# Patient Record
Sex: FEMALE | Race: WHITE | Employment: OTHER | URBAN - METROPOLITAN AREA
[De-identification: names, ages, dates, MRNs, and addresses within clinical notes are randomized per-mention and may not be internally consistent; named-entity substitution may affect disease eponyms.]

---

## 2017-03-15 ENCOUNTER — HOSPITAL ENCOUNTER (OUTPATIENT)
Dept: SURGERY | Age: 62
Discharge: HOME | End: 2017-03-15
Attending: UROLOGY | Admitting: UROLOGY

## 2017-03-15 VITALS
RESPIRATION RATE: 18 BRPM | DIASTOLIC BLOOD PRESSURE: 71 MMHG | BODY MASS INDEX: 28.83 KG/M2 | HEART RATE: 82 BPM | OXYGEN SATURATION: 99 % | TEMPERATURE: 97.4 F | SYSTOLIC BLOOD PRESSURE: 100 MMHG | HEIGHT: 59 IN | WEIGHT: 143 LBS

## 2017-03-15 DIAGNOSIS — R93.7: ICD-10-CM

## 2017-03-15 LAB
APTT: 41.3 SEC (ref 21–31.8)
INR BLD: 2.18 (ref 0.85–1.15)
PROTHROMBIN TIME: 24.6 SEC (ref 9.6–13)

## 2017-03-15 RX ORDER — MORPHINE SULFATE 4 MG/ML
1 INJECTION, SOLUTION INTRAMUSCULAR; INTRAVENOUS EVERY 5 MIN PRN
Status: DISCONTINUED | OUTPATIENT
Start: 2017-03-15 | End: 2017-03-16 | Stop reason: HOSPADM

## 2017-03-15 RX ORDER — PROMETHAZINE HYDROCHLORIDE 25 MG/ML
6.25 INJECTION, SOLUTION INTRAMUSCULAR; INTRAVENOUS
Status: ACTIVE | OUTPATIENT
Start: 2017-03-15 | End: 2017-03-15

## 2017-03-15 RX ORDER — PHENAZOPYRIDINE HYDROCHLORIDE 200 MG/1
200 TABLET, FILM COATED ORAL 3 TIMES DAILY PRN
Qty: 15 TABLET | Refills: 0 | Status: SHIPPED | OUTPATIENT
Start: 2017-03-15 | End: 2017-03-20

## 2017-03-15 RX ORDER — OXYCODONE HYDROCHLORIDE AND ACETAMINOPHEN 5; 325 MG/1; MG/1
1 TABLET ORAL PRN
Status: ACTIVE | OUTPATIENT
Start: 2017-03-15 | End: 2017-03-15

## 2017-03-15 RX ORDER — ONDANSETRON 2 MG/ML
4 INJECTION INTRAMUSCULAR; INTRAVENOUS
Status: ACTIVE | OUTPATIENT
Start: 2017-03-15 | End: 2017-03-15

## 2017-03-15 RX ORDER — HYDRALAZINE HYDROCHLORIDE 20 MG/ML
5 INJECTION INTRAMUSCULAR; INTRAVENOUS EVERY 10 MIN PRN
Status: DISCONTINUED | OUTPATIENT
Start: 2017-03-15 | End: 2017-03-16 | Stop reason: HOSPADM

## 2017-03-15 RX ORDER — HYDROMORPHONE HCL 110MG/55ML
0.25 PATIENT CONTROLLED ANALGESIA SYRINGE INTRAVENOUS EVERY 5 MIN PRN
Status: DISCONTINUED | OUTPATIENT
Start: 2017-03-15 | End: 2017-03-16 | Stop reason: HOSPADM

## 2017-03-15 RX ORDER — SODIUM CHLORIDE 0.9 % (FLUSH) 0.9 %
10 SYRINGE (ML) INJECTION EVERY 12 HOURS SCHEDULED
Status: DISCONTINUED | OUTPATIENT
Start: 2017-03-15 | End: 2017-03-16 | Stop reason: HOSPADM

## 2017-03-15 RX ORDER — SODIUM CHLORIDE, SODIUM LACTATE, POTASSIUM CHLORIDE, CALCIUM CHLORIDE 600; 310; 30; 20 MG/100ML; MG/100ML; MG/100ML; MG/100ML
INJECTION, SOLUTION INTRAVENOUS CONTINUOUS
Status: DISCONTINUED | OUTPATIENT
Start: 2017-03-15 | End: 2017-03-16 | Stop reason: HOSPADM

## 2017-03-15 RX ORDER — LABETALOL HYDROCHLORIDE 5 MG/ML
5 INJECTION, SOLUTION INTRAVENOUS EVERY 10 MIN PRN
Status: DISCONTINUED | OUTPATIENT
Start: 2017-03-15 | End: 2017-03-16 | Stop reason: HOSPADM

## 2017-03-15 RX ORDER — HYDROMORPHONE HCL 110MG/55ML
0.5 PATIENT CONTROLLED ANALGESIA SYRINGE INTRAVENOUS EVERY 5 MIN PRN
Status: DISCONTINUED | OUTPATIENT
Start: 2017-03-15 | End: 2017-03-16 | Stop reason: HOSPADM

## 2017-03-15 RX ORDER — MEPERIDINE HYDROCHLORIDE 25 MG/ML
12.5 INJECTION INTRAMUSCULAR; INTRAVENOUS; SUBCUTANEOUS EVERY 5 MIN PRN
Status: DISCONTINUED | OUTPATIENT
Start: 2017-03-15 | End: 2017-03-16 | Stop reason: HOSPADM

## 2017-03-15 RX ORDER — MORPHINE SULFATE 4 MG/ML
2 INJECTION, SOLUTION INTRAMUSCULAR; INTRAVENOUS EVERY 5 MIN PRN
Status: DISCONTINUED | OUTPATIENT
Start: 2017-03-15 | End: 2017-03-16 | Stop reason: HOSPADM

## 2017-03-15 RX ORDER — CEPHALEXIN 500 MG/1
500 CAPSULE ORAL 2 TIMES DAILY
Qty: 10 CAPSULE | Refills: 0 | Status: SHIPPED | OUTPATIENT
Start: 2017-03-15 | End: 2017-03-20

## 2017-03-15 RX ORDER — DIPHENHYDRAMINE HYDROCHLORIDE 50 MG/ML
12.5 INJECTION INTRAMUSCULAR; INTRAVENOUS
Status: ACTIVE | OUTPATIENT
Start: 2017-03-15 | End: 2017-03-15

## 2017-03-15 RX ORDER — OXYCODONE HYDROCHLORIDE AND ACETAMINOPHEN 5; 325 MG/1; MG/1
2 TABLET ORAL PRN
Status: ACTIVE | OUTPATIENT
Start: 2017-03-15 | End: 2017-03-15

## 2017-03-15 RX ORDER — SODIUM CHLORIDE 0.9 % (FLUSH) 0.9 %
10 SYRINGE (ML) INJECTION PRN
Status: DISCONTINUED | OUTPATIENT
Start: 2017-03-15 | End: 2017-03-16 | Stop reason: HOSPADM

## 2017-03-15 RX ORDER — LIDOCAINE HYDROCHLORIDE 10 MG/ML
1 INJECTION, SOLUTION EPIDURAL; INFILTRATION; INTRACAUDAL; PERINEURAL
Status: COMPLETED | OUTPATIENT
Start: 2017-03-15 | End: 2017-03-15

## 2017-03-15 RX ADMIN — LIDOCAINE HYDROCHLORIDE 1 ML: 10 INJECTION, SOLUTION EPIDURAL; INFILTRATION; INTRACAUDAL; PERINEURAL at 12:21

## 2017-03-15 RX ADMIN — SODIUM CHLORIDE, SODIUM LACTATE, POTASSIUM CHLORIDE, CALCIUM CHLORIDE: 600; 310; 30; 20 INJECTION, SOLUTION INTRAVENOUS at 12:20

## 2017-03-15 ASSESSMENT — PAIN SCALES - GENERAL
PAINLEVEL_OUTOF10: 0
PAINLEVEL_OUTOF10: 0

## 2017-03-15 ASSESSMENT — PAIN - FUNCTIONAL ASSESSMENT: PAIN_FUNCTIONAL_ASSESSMENT: 0-10

## 2017-09-13 ENCOUNTER — HOSPITAL ENCOUNTER (OUTPATIENT)
Dept: SURGERY | Age: 62
Discharge: HOME | End: 2017-09-13
Attending: UROLOGY | Admitting: UROLOGY

## 2017-09-13 VITALS
TEMPERATURE: 97.9 F | RESPIRATION RATE: 16 BRPM | HEART RATE: 79 BPM | OXYGEN SATURATION: 96 % | BODY MASS INDEX: 27.68 KG/M2 | WEIGHT: 141 LBS | SYSTOLIC BLOOD PRESSURE: 128 MMHG | HEIGHT: 60 IN | DIASTOLIC BLOOD PRESSURE: 51 MMHG

## 2017-09-13 LAB
APTT: 48.1 SEC (ref 24.1–34.9)
INR BLD: 2.14 (ref 0.85–1.15)
PROTHROMBIN TIME: 24.2 SEC (ref 9.6–13)

## 2017-09-13 RX ORDER — OXYCODONE HYDROCHLORIDE AND ACETAMINOPHEN 5; 325 MG/1; MG/1
1 TABLET ORAL PRN
Status: ACTIVE | OUTPATIENT
Start: 2017-09-13 | End: 2017-09-13

## 2017-09-13 RX ORDER — HYDROMORPHONE HCL 110MG/55ML
0.25 PATIENT CONTROLLED ANALGESIA SYRINGE INTRAVENOUS EVERY 5 MIN PRN
Status: DISCONTINUED | OUTPATIENT
Start: 2017-09-13 | End: 2017-09-14 | Stop reason: HOSPADM

## 2017-09-13 RX ORDER — MEPERIDINE HYDROCHLORIDE 25 MG/ML
12.5 INJECTION INTRAMUSCULAR; INTRAVENOUS; SUBCUTANEOUS EVERY 5 MIN PRN
Status: DISCONTINUED | OUTPATIENT
Start: 2017-09-13 | End: 2017-09-14 | Stop reason: HOSPADM

## 2017-09-13 RX ORDER — HYDROMORPHONE HCL 110MG/55ML
0.5 PATIENT CONTROLLED ANALGESIA SYRINGE INTRAVENOUS EVERY 5 MIN PRN
Status: DISCONTINUED | OUTPATIENT
Start: 2017-09-13 | End: 2017-09-14 | Stop reason: HOSPADM

## 2017-09-13 RX ORDER — ONDANSETRON 2 MG/ML
4 INJECTION INTRAMUSCULAR; INTRAVENOUS
Status: ACTIVE | OUTPATIENT
Start: 2017-09-13 | End: 2017-09-13

## 2017-09-13 RX ORDER — DIPHENHYDRAMINE HYDROCHLORIDE 50 MG/ML
12.5 INJECTION INTRAMUSCULAR; INTRAVENOUS
Status: ACTIVE | OUTPATIENT
Start: 2017-09-13 | End: 2017-09-13

## 2017-09-13 RX ORDER — LIDOCAINE HYDROCHLORIDE 10 MG/ML
1 INJECTION, SOLUTION EPIDURAL; INFILTRATION; INTRACAUDAL; PERINEURAL
Status: COMPLETED | OUTPATIENT
Start: 2017-09-13 | End: 2017-09-13

## 2017-09-13 RX ORDER — OXYCODONE HYDROCHLORIDE AND ACETAMINOPHEN 5; 325 MG/1; MG/1
2 TABLET ORAL PRN
Status: ACTIVE | OUTPATIENT
Start: 2017-09-13 | End: 2017-09-13

## 2017-09-13 RX ORDER — PHENAZOPYRIDINE HYDROCHLORIDE 200 MG/1
200 TABLET, FILM COATED ORAL 3 TIMES DAILY PRN
Qty: 15 TABLET | Refills: 0 | Status: SHIPPED | OUTPATIENT
Start: 2017-09-13 | End: 2017-09-18

## 2017-09-13 RX ORDER — SODIUM CHLORIDE 0.9 % (FLUSH) 0.9 %
10 SYRINGE (ML) INJECTION EVERY 12 HOURS SCHEDULED
Status: DISCONTINUED | OUTPATIENT
Start: 2017-09-13 | End: 2017-09-14 | Stop reason: HOSPADM

## 2017-09-13 RX ORDER — CEPHALEXIN 250 MG/1
250 CAPSULE ORAL 2 TIMES DAILY
Qty: 10 CAPSULE | Refills: 0 | Status: SHIPPED | OUTPATIENT
Start: 2017-09-13 | End: 2017-09-18

## 2017-09-13 RX ORDER — SODIUM CHLORIDE 0.9 % (FLUSH) 0.9 %
10 SYRINGE (ML) INJECTION PRN
Status: DISCONTINUED | OUTPATIENT
Start: 2017-09-13 | End: 2017-09-14 | Stop reason: HOSPADM

## 2017-09-13 RX ORDER — SODIUM CHLORIDE, SODIUM LACTATE, POTASSIUM CHLORIDE, CALCIUM CHLORIDE 600; 310; 30; 20 MG/100ML; MG/100ML; MG/100ML; MG/100ML
INJECTION, SOLUTION INTRAVENOUS CONTINUOUS
Status: DISCONTINUED | OUTPATIENT
Start: 2017-09-13 | End: 2017-09-14 | Stop reason: HOSPADM

## 2017-09-13 RX ORDER — LABETALOL HYDROCHLORIDE 5 MG/ML
5 INJECTION, SOLUTION INTRAVENOUS EVERY 10 MIN PRN
Status: DISCONTINUED | OUTPATIENT
Start: 2017-09-13 | End: 2017-09-14 | Stop reason: HOSPADM

## 2017-09-13 RX ORDER — HYDRALAZINE HYDROCHLORIDE 20 MG/ML
5 INJECTION INTRAMUSCULAR; INTRAVENOUS
Status: DISCONTINUED | OUTPATIENT
Start: 2017-09-13 | End: 2017-09-14 | Stop reason: HOSPADM

## 2017-09-13 RX ADMIN — SODIUM CHLORIDE, SODIUM LACTATE, POTASSIUM CHLORIDE, CALCIUM CHLORIDE: 600; 310; 30; 20 INJECTION, SOLUTION INTRAVENOUS at 11:59

## 2017-09-13 RX ADMIN — LIDOCAINE HYDROCHLORIDE 1 ML: 10 INJECTION, SOLUTION EPIDURAL; INFILTRATION; INTRACAUDAL; PERINEURAL at 12:00

## 2017-09-13 ASSESSMENT — PAIN - FUNCTIONAL ASSESSMENT: PAIN_FUNCTIONAL_ASSESSMENT: 0-10

## 2017-09-14 LAB
EKG ATRIAL RATE: 93 BPM
EKG DIAGNOSIS: NORMAL
EKG P AXIS: 36 DEGREES
EKG P-R INTERVAL: 142 MS
EKG Q-T INTERVAL: 376 MS
EKG QRS DURATION: 62 MS
EKG QTC CALCULATION (BAZETT): 467 MS
EKG R AXIS: 34 DEGREES
EKG T AXIS: 49 DEGREES
EKG VENTRICULAR RATE: 93 BPM

## 2017-09-14 PROCEDURE — 93010 ELECTROCARDIOGRAM REPORT: CPT | Performed by: INTERNAL MEDICINE

## 2018-03-02 ENCOUNTER — HOSPITAL ENCOUNTER (OUTPATIENT)
Dept: MAMMOGRAPHY | Age: 63
Discharge: HOME | End: 2018-03-02
Attending: PHYSICIAN ASSISTANT | Admitting: PHYSICIAN ASSISTANT

## 2018-03-02 DIAGNOSIS — Z12.31 ENCOUNTER FOR SCREENING MAMMOGRAM FOR BREAST CANCER: ICD-10-CM

## 2019-02-02 ENCOUNTER — HOSPITAL ENCOUNTER (EMERGENCY)
Age: 64
Discharge: HOME | End: 2019-02-03
Attending: EMERGENCY MEDICINE
Payer: MEDICARE

## 2019-02-02 ENCOUNTER — APPOINTMENT (OUTPATIENT)
Dept: CT IMAGING | Age: 64
End: 2019-02-02
Payer: MEDICARE

## 2019-02-02 VITALS
RESPIRATION RATE: 24 BRPM | DIASTOLIC BLOOD PRESSURE: 93 MMHG | OXYGEN SATURATION: 92 % | BODY MASS INDEX: 29.84 KG/M2 | SYSTOLIC BLOOD PRESSURE: 135 MMHG | WEIGHT: 152 LBS | HEIGHT: 60 IN | TEMPERATURE: 97.4 F | HEART RATE: 106 BPM

## 2019-02-02 DIAGNOSIS — J18.9 PNEUMONIA DUE TO ORGANISM: ICD-10-CM

## 2019-02-02 DIAGNOSIS — R10.12 LEFT UPPER QUADRANT PAIN: Primary | ICD-10-CM

## 2019-02-02 LAB
A/G RATIO: 1.2 (ref 1.1–2.2)
ALBUMIN SERPL-MCNC: 4 G/DL (ref 3.4–5)
ALP BLD-CCNC: 136 U/L (ref 40–129)
ALT SERPL-CCNC: 8 U/L (ref 10–40)
ANION GAP SERPL CALCULATED.3IONS-SCNC: 13 MMOL/L (ref 3–16)
AST SERPL-CCNC: 11 U/L (ref 15–37)
BACTERIA: ABNORMAL /HPF
BANDED NEUTROPHILS RELATIVE PERCENT: 6 % (ref 0–7)
BASOPHILS ABSOLUTE: 0.2 K/UL (ref 0–0.2)
BASOPHILS RELATIVE PERCENT: 1 %
BILIRUB SERPL-MCNC: <0.2 MG/DL (ref 0–1)
BILIRUBIN URINE: NEGATIVE
BLOOD, URINE: ABNORMAL
BUN BLDV-MCNC: 12 MG/DL (ref 7–20)
CALCIUM SERPL-MCNC: 9.7 MG/DL (ref 8.3–10.6)
CHLORIDE BLD-SCNC: 105 MMOL/L (ref 99–110)
CLARITY: CLEAR
CO2: 25 MMOL/L (ref 21–32)
COLOR: YELLOW
CREAT SERPL-MCNC: 0.7 MG/DL (ref 0.6–1.2)
EOSINOPHILS ABSOLUTE: 0.2 K/UL (ref 0–0.6)
EOSINOPHILS RELATIVE PERCENT: 1 %
EPITHELIAL CELLS, UA: ABNORMAL /HPF
GFR AFRICAN AMERICAN: >60
GFR NON-AFRICAN AMERICAN: >60
GLOBULIN: 3.3 G/DL
GLUCOSE BLD-MCNC: 124 MG/DL (ref 70–99)
GLUCOSE URINE: NEGATIVE MG/DL
HCT VFR BLD CALC: 43 % (ref 36–48)
HEMOGLOBIN: 14.4 G/DL (ref 12–16)
INR BLD: 1.98 (ref 0.86–1.14)
KETONES, URINE: NEGATIVE MG/DL
LACTIC ACID: 1.5 MMOL/L (ref 0.4–2)
LEUKOCYTE ESTERASE, URINE: NEGATIVE
LYMPHOCYTES ABSOLUTE: 4.4 K/UL (ref 1–5.1)
LYMPHOCYTES RELATIVE PERCENT: 26 %
MCH RBC QN AUTO: 30.5 PG (ref 26–34)
MCHC RBC AUTO-ENTMCNC: 33.5 G/DL (ref 31–36)
MCV RBC AUTO: 91 FL (ref 80–100)
MICROSCOPIC EXAMINATION: YES
MONOCYTES ABSOLUTE: 0.8 K/UL (ref 0–1.3)
MONOCYTES RELATIVE PERCENT: 5 %
NEUTROPHILS ABSOLUTE: 11.3 K/UL (ref 1.7–7.7)
NEUTROPHILS RELATIVE PERCENT: 61 %
NITRITE, URINE: NEGATIVE
PDW BLD-RTO: 14.3 % (ref 12.4–15.4)
PH UA: 6.5
PLATELET # BLD: 446 K/UL (ref 135–450)
PMV BLD AUTO: 8.5 FL (ref 5–10.5)
POTASSIUM REFLEX MAGNESIUM: 4.3 MMOL/L (ref 3.5–5.1)
PROTEIN UA: NEGATIVE MG/DL
PROTHROMBIN TIME: 22.1 SEC (ref 9.8–13)
RBC # BLD: 4.73 M/UL (ref 4–5.2)
RBC UA: ABNORMAL /HPF (ref 0–2)
SLIDE REVIEW: ABNORMAL
SODIUM BLD-SCNC: 143 MMOL/L (ref 136–145)
SPECIFIC GRAVITY UA: 1.02
TOTAL PROTEIN: 7.3 G/DL (ref 6.4–8.2)
URINE TYPE: ABNORMAL
UROBILINOGEN, URINE: 0.2 E.U./DL
WBC # BLD: 16.8 K/UL (ref 4–11)
WBC UA: ABNORMAL /HPF (ref 0–5)

## 2019-02-02 PROCEDURE — 99285 EMERGENCY DEPT VISIT HI MDM: CPT

## 2019-02-02 PROCEDURE — 80053 COMPREHEN METABOLIC PANEL: CPT

## 2019-02-02 PROCEDURE — 74177 CT ABD & PELVIS W/CONTRAST: CPT

## 2019-02-02 PROCEDURE — 81001 URINALYSIS AUTO W/SCOPE: CPT

## 2019-02-02 PROCEDURE — 6360000004 HC RX CONTRAST MEDICATION: Performed by: EMERGENCY MEDICINE

## 2019-02-02 PROCEDURE — 83605 ASSAY OF LACTIC ACID: CPT

## 2019-02-02 PROCEDURE — 96365 THER/PROPH/DIAG IV INF INIT: CPT

## 2019-02-02 PROCEDURE — 36415 COLL VENOUS BLD VENIPUNCTURE: CPT

## 2019-02-02 PROCEDURE — 96375 TX/PRO/DX INJ NEW DRUG ADDON: CPT

## 2019-02-02 PROCEDURE — 6360000002 HC RX W HCPCS: Performed by: EMERGENCY MEDICINE

## 2019-02-02 PROCEDURE — 6370000000 HC RX 637 (ALT 250 FOR IP): Performed by: EMERGENCY MEDICINE

## 2019-02-02 PROCEDURE — 2580000003 HC RX 258: Performed by: EMERGENCY MEDICINE

## 2019-02-02 PROCEDURE — 85025 COMPLETE CBC W/AUTO DIFF WBC: CPT

## 2019-02-02 PROCEDURE — 85610 PROTHROMBIN TIME: CPT

## 2019-02-02 RX ORDER — CIPROFLOXACIN 500 MG/1
500 TABLET, FILM COATED ORAL 2 TIMES DAILY
Qty: 14 TABLET | Refills: 0 | Status: SHIPPED | OUTPATIENT
Start: 2019-02-02 | End: 2019-02-09

## 2019-02-02 RX ORDER — CIPROFLOXACIN 500 MG/1
500 TABLET, FILM COATED ORAL ONCE
Status: COMPLETED | OUTPATIENT
Start: 2019-02-02 | End: 2019-02-02

## 2019-02-02 RX ORDER — MORPHINE SULFATE 10 MG/ML
INJECTION, SOLUTION INTRAMUSCULAR; INTRAVENOUS
Status: DISCONTINUED
Start: 2019-02-02 | End: 2019-02-03 | Stop reason: HOSPADM

## 2019-02-02 RX ORDER — CLINDAMYCIN HYDROCHLORIDE 150 MG/1
300 CAPSULE ORAL ONCE
Status: COMPLETED | OUTPATIENT
Start: 2019-02-02 | End: 2019-02-02

## 2019-02-02 RX ORDER — MORPHINE SULFATE 2 MG/ML
2 INJECTION, SOLUTION INTRAMUSCULAR; INTRAVENOUS ONCE
Status: COMPLETED | OUTPATIENT
Start: 2019-02-02 | End: 2019-02-02

## 2019-02-02 RX ORDER — CLINDAMYCIN HYDROCHLORIDE 300 MG/1
300 CAPSULE ORAL 2 TIMES DAILY
Qty: 14 CAPSULE | Refills: 0 | Status: SHIPPED | OUTPATIENT
Start: 2019-02-02 | End: 2019-02-09

## 2019-02-02 RX ADMIN — DEXTROSE MONOHYDRATE 500 MG: 50 INJECTION, SOLUTION INTRAVENOUS at 23:20

## 2019-02-02 RX ADMIN — IOPAMIDOL 75 ML: 755 INJECTION, SOLUTION INTRAVENOUS at 22:27

## 2019-02-02 RX ADMIN — CLINDAMYCIN HYDROCHLORIDE 300 MG: 150 CAPSULE ORAL at 23:20

## 2019-02-02 RX ADMIN — MORPHINE SULFATE 2 MG: 2 INJECTION, SOLUTION INTRAMUSCULAR; INTRAVENOUS at 22:32

## 2019-02-02 RX ADMIN — CIPROFLOXACIN HYDROCHLORIDE 500 MG: 500 TABLET, FILM COATED ORAL at 23:20

## 2019-02-02 ASSESSMENT — PAIN DESCRIPTION - ORIENTATION: ORIENTATION: LEFT

## 2019-02-02 ASSESSMENT — PAIN DESCRIPTION - LOCATION: LOCATION: ABDOMEN

## 2019-02-02 ASSESSMENT — PAIN DESCRIPTION - PAIN TYPE: TYPE: ACUTE PAIN

## 2019-02-02 ASSESSMENT — PAIN DESCRIPTION - DESCRIPTORS: DESCRIPTORS: CONSTANT;RADIATING;SHARP

## 2019-02-02 ASSESSMENT — PAIN SCALES - GENERAL: PAINLEVEL_OUTOF10: 9

## 2019-09-24 RX ORDER — OMEPRAZOLE 20 MG/1
20 CAPSULE, DELAYED RELEASE ORAL DAILY
COMMUNITY

## 2019-09-30 ENCOUNTER — ANESTHESIA EVENT (OUTPATIENT)
Dept: OPERATING ROOM | Age: 64
End: 2019-09-30
Payer: MEDICARE

## 2019-09-30 ENCOUNTER — HOSPITAL ENCOUNTER (OUTPATIENT)
Age: 64
Setting detail: OUTPATIENT SURGERY
Discharge: HOME | End: 2019-09-30
Attending: UROLOGY | Admitting: UROLOGY
Payer: MEDICARE

## 2019-09-30 ENCOUNTER — ANESTHESIA (OUTPATIENT)
Dept: OPERATING ROOM | Age: 64
End: 2019-09-30
Payer: MEDICARE

## 2019-09-30 VITALS
WEIGHT: 141 LBS | SYSTOLIC BLOOD PRESSURE: 106 MMHG | DIASTOLIC BLOOD PRESSURE: 58 MMHG | HEIGHT: 60 IN | OXYGEN SATURATION: 94 % | HEART RATE: 79 BPM | BODY MASS INDEX: 27.68 KG/M2 | TEMPERATURE: 97.7 F | RESPIRATION RATE: 20 BRPM

## 2019-09-30 VITALS
SYSTOLIC BLOOD PRESSURE: 103 MMHG | OXYGEN SATURATION: 94 % | DIASTOLIC BLOOD PRESSURE: 62 MMHG | RESPIRATION RATE: 18 BRPM

## 2019-09-30 LAB
APTT: 49 SEC (ref 26–36)
INR BLD: 1.76 (ref 0.86–1.14)
PROTHROMBIN TIME: 20.1 SEC (ref 9.8–13)

## 2019-09-30 PROCEDURE — 2709999900 HC NON-CHARGEABLE SUPPLY: Performed by: UROLOGY

## 2019-09-30 PROCEDURE — 7100000011 HC PHASE II RECOVERY - ADDTL 15 MIN: Performed by: UROLOGY

## 2019-09-30 PROCEDURE — 85730 THROMBOPLASTIN TIME PARTIAL: CPT

## 2019-09-30 PROCEDURE — 36415 COLL VENOUS BLD VENIPUNCTURE: CPT

## 2019-09-30 PROCEDURE — 3700000001 HC ADD 15 MINUTES (ANESTHESIA): Performed by: UROLOGY

## 2019-09-30 PROCEDURE — 7100000010 HC PHASE II RECOVERY - FIRST 15 MIN: Performed by: UROLOGY

## 2019-09-30 PROCEDURE — 6370000000 HC RX 637 (ALT 250 FOR IP): Performed by: UROLOGY

## 2019-09-30 PROCEDURE — 6360000002 HC RX W HCPCS: Performed by: NURSE ANESTHETIST, CERTIFIED REGISTERED

## 2019-09-30 PROCEDURE — 3700000000 HC ANESTHESIA ATTENDED CARE: Performed by: UROLOGY

## 2019-09-30 PROCEDURE — 2580000003 HC RX 258: Performed by: ANESTHESIOLOGY

## 2019-09-30 PROCEDURE — 3600000004 HC SURGERY LEVEL 4 BASE: Performed by: UROLOGY

## 2019-09-30 PROCEDURE — 6360000002 HC RX W HCPCS: Performed by: UROLOGY

## 2019-09-30 PROCEDURE — 2500000003 HC RX 250 WO HCPCS: Performed by: NURSE ANESTHETIST, CERTIFIED REGISTERED

## 2019-09-30 PROCEDURE — 85610 PROTHROMBIN TIME: CPT

## 2019-09-30 PROCEDURE — 3600000014 HC SURGERY LEVEL 4 ADDTL 15MIN: Performed by: UROLOGY

## 2019-09-30 RX ORDER — MORPHINE SULFATE 10 MG/ML
2 INJECTION, SOLUTION INTRAMUSCULAR; INTRAVENOUS EVERY 5 MIN PRN
Status: DISCONTINUED | OUTPATIENT
Start: 2019-09-30 | End: 2019-09-30 | Stop reason: HOSPADM

## 2019-09-30 RX ORDER — PHENAZOPYRIDINE HYDROCHLORIDE 200 MG/1
200 TABLET, FILM COATED ORAL 3 TIMES DAILY PRN
Qty: 15 TABLET | Refills: 0 | Status: SHIPPED | OUTPATIENT
Start: 2019-09-30 | End: 2019-10-05

## 2019-09-30 RX ORDER — SODIUM CHLORIDE, SODIUM LACTATE, POTASSIUM CHLORIDE, CALCIUM CHLORIDE 600; 310; 30; 20 MG/100ML; MG/100ML; MG/100ML; MG/100ML
INJECTION, SOLUTION INTRAVENOUS CONTINUOUS
Status: DISCONTINUED | OUTPATIENT
Start: 2019-09-30 | End: 2019-09-30 | Stop reason: HOSPADM

## 2019-09-30 RX ORDER — LABETALOL HYDROCHLORIDE 5 MG/ML
5 INJECTION, SOLUTION INTRAVENOUS EVERY 10 MIN PRN
Status: DISCONTINUED | OUTPATIENT
Start: 2019-09-30 | End: 2019-09-30 | Stop reason: HOSPADM

## 2019-09-30 RX ORDER — LIDOCAINE HYDROCHLORIDE 20 MG/ML
JELLY TOPICAL PRN
Status: DISCONTINUED | OUTPATIENT
Start: 2019-09-30 | End: 2019-09-30 | Stop reason: ALTCHOICE

## 2019-09-30 RX ORDER — FENTANYL CITRATE 50 UG/ML
INJECTION, SOLUTION INTRAMUSCULAR; INTRAVENOUS PRN
Status: DISCONTINUED | OUTPATIENT
Start: 2019-09-30 | End: 2019-09-30 | Stop reason: SDUPTHER

## 2019-09-30 RX ORDER — LIDOCAINE HYDROCHLORIDE 10 MG/ML
0.3 INJECTION, SOLUTION EPIDURAL; INFILTRATION; INTRACAUDAL; PERINEURAL
Status: DISCONTINUED | OUTPATIENT
Start: 2019-09-30 | End: 2019-09-30 | Stop reason: HOSPADM

## 2019-09-30 RX ORDER — OXYCODONE HYDROCHLORIDE AND ACETAMINOPHEN 5; 325 MG/1; MG/1
2 TABLET ORAL PRN
Status: DISCONTINUED | OUTPATIENT
Start: 2019-09-30 | End: 2019-09-30 | Stop reason: HOSPADM

## 2019-09-30 RX ORDER — CEPHALEXIN 250 MG/1
250 CAPSULE ORAL 2 TIMES DAILY
Qty: 10 CAPSULE | Refills: 0 | Status: SHIPPED | OUTPATIENT
Start: 2019-09-30 | End: 2019-10-05

## 2019-09-30 RX ORDER — PROMETHAZINE HYDROCHLORIDE 25 MG/ML
6.25 INJECTION, SOLUTION INTRAMUSCULAR; INTRAVENOUS
Status: DISCONTINUED | OUTPATIENT
Start: 2019-09-30 | End: 2019-09-30 | Stop reason: HOSPADM

## 2019-09-30 RX ORDER — DIPHENHYDRAMINE HYDROCHLORIDE 50 MG/ML
12.5 INJECTION INTRAMUSCULAR; INTRAVENOUS
Status: DISCONTINUED | OUTPATIENT
Start: 2019-09-30 | End: 2019-09-30 | Stop reason: HOSPADM

## 2019-09-30 RX ORDER — LIDOCAINE HYDROCHLORIDE 20 MG/ML
INJECTION, SOLUTION INFILTRATION; PERINEURAL PRN
Status: DISCONTINUED | OUTPATIENT
Start: 2019-09-30 | End: 2019-09-30 | Stop reason: SDUPTHER

## 2019-09-30 RX ORDER — MORPHINE SULFATE 10 MG/ML
1 INJECTION, SOLUTION INTRAMUSCULAR; INTRAVENOUS EVERY 5 MIN PRN
Status: DISCONTINUED | OUTPATIENT
Start: 2019-09-30 | End: 2019-09-30 | Stop reason: HOSPADM

## 2019-09-30 RX ORDER — ONDANSETRON 2 MG/ML
4 INJECTION INTRAMUSCULAR; INTRAVENOUS
Status: DISCONTINUED | OUTPATIENT
Start: 2019-09-30 | End: 2019-09-30 | Stop reason: HOSPADM

## 2019-09-30 RX ORDER — HYDRALAZINE HYDROCHLORIDE 20 MG/ML
5 INJECTION INTRAMUSCULAR; INTRAVENOUS EVERY 10 MIN PRN
Status: DISCONTINUED | OUTPATIENT
Start: 2019-09-30 | End: 2019-09-30 | Stop reason: HOSPADM

## 2019-09-30 RX ORDER — OXYCODONE HYDROCHLORIDE AND ACETAMINOPHEN 5; 325 MG/1; MG/1
1 TABLET ORAL PRN
Status: DISCONTINUED | OUTPATIENT
Start: 2019-09-30 | End: 2019-09-30 | Stop reason: HOSPADM

## 2019-09-30 RX ORDER — SODIUM CHLORIDE 0.9 % (FLUSH) 0.9 %
10 SYRINGE (ML) INJECTION PRN
Status: DISCONTINUED | OUTPATIENT
Start: 2019-09-30 | End: 2019-09-30 | Stop reason: HOSPADM

## 2019-09-30 RX ORDER — PROPOFOL 10 MG/ML
INJECTION, EMULSION INTRAVENOUS PRN
Status: DISCONTINUED | OUTPATIENT
Start: 2019-09-30 | End: 2019-09-30 | Stop reason: SDUPTHER

## 2019-09-30 RX ORDER — SODIUM CHLORIDE 0.9 % (FLUSH) 0.9 %
10 SYRINGE (ML) INJECTION EVERY 12 HOURS SCHEDULED
Status: DISCONTINUED | OUTPATIENT
Start: 2019-09-30 | End: 2019-09-30 | Stop reason: HOSPADM

## 2019-09-30 RX ADMIN — SODIUM CHLORIDE, POTASSIUM CHLORIDE, SODIUM LACTATE AND CALCIUM CHLORIDE: 600; 310; 30; 20 INJECTION, SOLUTION INTRAVENOUS at 14:10

## 2019-09-30 RX ADMIN — FENTANYL CITRATE 50 MCG: 50 INJECTION INTRAMUSCULAR; INTRAVENOUS at 14:07

## 2019-09-30 RX ADMIN — LIDOCAINE HYDROCHLORIDE 40 MG: 20 INJECTION, SOLUTION INFILTRATION; PERINEURAL at 14:10

## 2019-09-30 RX ADMIN — PROPOFOL 120 MG: 10 INJECTION, EMULSION INTRAVENOUS at 14:10

## 2019-09-30 RX ADMIN — Medication 2 G: at 14:01

## 2019-09-30 ASSESSMENT — PULMONARY FUNCTION TESTS
PIF_VALUE: 0

## 2019-09-30 ASSESSMENT — LIFESTYLE VARIABLES: SMOKING_STATUS: 1

## 2019-09-30 ASSESSMENT — ENCOUNTER SYMPTOMS: SHORTNESS OF BREATH: 1

## 2019-09-30 ASSESSMENT — PAIN SCALES - GENERAL
PAINLEVEL_OUTOF10: 0
PAINLEVEL_OUTOF10: 0

## 2019-09-30 ASSESSMENT — PAIN - FUNCTIONAL ASSESSMENT: PAIN_FUNCTIONAL_ASSESSMENT: 0-10

## 2019-09-30 NOTE — ANESTHESIA PRE PROCEDURE
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    Essential hypertension I10    Other and unspecified hyperlipidemia E78.5    Syncope R55    Ovary, torsion N83.519    Pelvic pain in female R10.2    Meningioma ([address] D32.9    Hypertension I10    Disc disease, degenerative, cervical M50.30    CVA (cerebral vascular accident) ([address] I63.9    Brain aneurysm I67.1    Pelvic mass in female (10.3 x 9.6 cm on CT scan) R19.00    LONG TERM ANTICOAGULENT USE     Bladder cancer ([address] C67.9    Right tubo-ovarian mass N83.8       Past Medical History:        Diagnosis Date    Bladder cancer ([address] 08/17/2016    Brain aneurysm     brain tumor per pt (2013)    CVA (cerebral vascular accident) ([address]     3 EPISODES.  Disc disease, degenerative, cervical     Hypertension     Meningioma ([address]     Migraine        Past Surgical History:        Procedure Laterality Date    BLADDER TUMOR EXCISION  08/17/2016    with Dr. Reji Avina N/A 08/17/2016     URETHRAL DILATATION, TRANSURETHERAL RESECTION OF LARGE BLADDER TUMOR    CYSTOSCOPY  03/15/2017    URETHRAL DILATION    CYSTOSCOPY  09/13/2014    [facility]    HYSTERECTOMY, VAGINAL  1992    intractable bleeding    OTHER SURGICAL HISTORY  11/09/2016    cystoscopy bladder biopsy       Social History:    Social History     Tobacco Use    Smoking status: Current Every Day Smoker     Packs/day: 1.00     Years: 40.00     Pack years: 40.00     Types: Cigarettes    Smokeless tobacco: Current User   Substance Use Topics    Alcohol use:  No                                Ready to quit: Not Answered  Counseling given: Not Answered      Vital Signs (Current):   Vitals:    09/24/19 1051 09/30/19 1317   BP:  126/81   Pulse:  86

## 2020-01-28 ENCOUNTER — HOSPITAL ENCOUNTER (EMERGENCY)
Age: 65
Discharge: ANOTHER HOSPITAL | End: 2020-01-28
Attending: EMERGENCY MEDICINE
Payer: MEDICARE

## 2020-01-28 ENCOUNTER — APPOINTMENT (OUTPATIENT)
Dept: CT IMAGING | Age: 65
End: 2020-01-28
Payer: MEDICARE

## 2020-01-28 ENCOUNTER — APPOINTMENT (OUTPATIENT)
Dept: GENERAL RADIOLOGY | Age: 65
End: 2020-01-28
Payer: MEDICARE

## 2020-01-28 ENCOUNTER — HOSPITAL ENCOUNTER (INPATIENT)
Age: 65
LOS: 1 days | Discharge: HOME HEALTH | DRG: 312 | End: 2020-01-30
Attending: INTERNAL MEDICINE | Admitting: HOSPITALIST
Payer: MEDICARE

## 2020-01-28 VITALS
BODY MASS INDEX: 28.47 KG/M2 | OXYGEN SATURATION: 99 % | SYSTOLIC BLOOD PRESSURE: 123 MMHG | WEIGHT: 145 LBS | DIASTOLIC BLOOD PRESSURE: 90 MMHG | TEMPERATURE: 98.3 F | HEIGHT: 60 IN | HEART RATE: 97 BPM | RESPIRATION RATE: 27 BRPM

## 2020-01-28 LAB
A/G RATIO: 1.3 (ref 1.1–2.2)
ALBUMIN SERPL-MCNC: 3.9 G/DL (ref 3.4–5)
ALP BLD-CCNC: 127 U/L (ref 40–129)
ALT SERPL-CCNC: 12 U/L (ref 10–40)
ANION GAP SERPL CALCULATED.3IONS-SCNC: 16 MMOL/L (ref 3–16)
AST SERPL-CCNC: 11 U/L (ref 15–37)
BACTERIA: ABNORMAL /HPF
BILIRUB SERPL-MCNC: <0.2 MG/DL (ref 0–1)
BILIRUBIN URINE: NEGATIVE
BLOOD, URINE: ABNORMAL
BUN BLDV-MCNC: 16 MG/DL (ref 7–20)
CALCIUM SERPL-MCNC: 9 MG/DL (ref 8.3–10.6)
CHLORIDE BLD-SCNC: 105 MMOL/L (ref 99–110)
CLARITY: ABNORMAL
CO2: 21 MMOL/L (ref 21–32)
COLOR: YELLOW
CREAT SERPL-MCNC: 0.9 MG/DL (ref 0.6–1.2)
EKG ATRIAL RATE: 95 BPM
EKG DIAGNOSIS: NORMAL
EKG P AXIS: 47 DEGREES
EKG P-R INTERVAL: 140 MS
EKG Q-T INTERVAL: 384 MS
EKG QRS DURATION: 74 MS
EKG QTC CALCULATION (BAZETT): 482 MS
EKG R AXIS: 57 DEGREES
EKG T AXIS: 50 DEGREES
EKG VENTRICULAR RATE: 95 BPM
EPITHELIAL CELLS, UA: ABNORMAL /HPF
GFR AFRICAN AMERICAN: >60
GFR NON-AFRICAN AMERICAN: >60
GLOBULIN: 2.9 G/DL
GLUCOSE BLD-MCNC: 120 MG/DL (ref 70–99)
GLUCOSE BLD-MCNC: 121 MG/DL (ref 70–99)
GLUCOSE URINE: NEGATIVE MG/DL
INR BLD: 2.55 (ref 0.86–1.14)
KETONES, URINE: NEGATIVE MG/DL
LACTIC ACID: 3.4 MMOL/L (ref 0.4–2)
LEUKOCYTE ESTERASE, URINE: NEGATIVE
MAGNESIUM: 1.8 MG/DL (ref 1.8–2.4)
MICROSCOPIC EXAMINATION: YES
MUCUS: ABNORMAL /LPF
NITRITE, URINE: NEGATIVE
PERFORMED ON: ABNORMAL
PH UA: 6 (ref 5–8)
POTASSIUM SERPL-SCNC: 3.4 MMOL/L (ref 3.5–5.1)
PROTEIN UA: ABNORMAL MG/DL
PROTHROMBIN TIME: 28.8 SEC (ref 10–13.2)
RBC UA: ABNORMAL /HPF (ref 0–2)
SODIUM BLD-SCNC: 142 MMOL/L (ref 136–145)
SPECIFIC GRAVITY UA: 1.01 (ref 1–1.03)
TOTAL PROTEIN: 6.8 G/DL (ref 6.4–8.2)
TROPONIN: <0.01 NG/ML
URINE TYPE: ABNORMAL
UROBILINOGEN, URINE: 0.2 E.U./DL
WBC UA: ABNORMAL /HPF (ref 0–5)

## 2020-01-28 PROCEDURE — 2580000003 HC RX 258: Performed by: INTERNAL MEDICINE

## 2020-01-28 PROCEDURE — 83605 ASSAY OF LACTIC ACID: CPT

## 2020-01-28 PROCEDURE — 85025 COMPLETE CBC W/AUTO DIFF WBC: CPT

## 2020-01-28 PROCEDURE — 87086 URINE CULTURE/COLONY COUNT: CPT

## 2020-01-28 PROCEDURE — 84484 ASSAY OF TROPONIN QUANT: CPT

## 2020-01-28 PROCEDURE — 83735 ASSAY OF MAGNESIUM: CPT

## 2020-01-28 PROCEDURE — G0378 HOSPITAL OBSERVATION PER HR: HCPCS

## 2020-01-28 PROCEDURE — 6360000004 HC RX CONTRAST MEDICATION: Performed by: EMERGENCY MEDICINE

## 2020-01-28 PROCEDURE — 6370000000 HC RX 637 (ALT 250 FOR IP): Performed by: INTERNAL MEDICINE

## 2020-01-28 PROCEDURE — 80053 COMPREHEN METABOLIC PANEL: CPT

## 2020-01-28 PROCEDURE — 70498 CT ANGIOGRAPHY NECK: CPT

## 2020-01-28 PROCEDURE — 71045 X-RAY EXAM CHEST 1 VIEW: CPT

## 2020-01-28 PROCEDURE — 93005 ELECTROCARDIOGRAM TRACING: CPT | Performed by: EMERGENCY MEDICINE

## 2020-01-28 PROCEDURE — G0379 DIRECT REFER HOSPITAL OBSERV: HCPCS

## 2020-01-28 PROCEDURE — 99285 EMERGENCY DEPT VISIT HI MDM: CPT

## 2020-01-28 PROCEDURE — 81001 URINALYSIS AUTO W/SCOPE: CPT

## 2020-01-28 PROCEDURE — 70450 CT HEAD/BRAIN W/O DYE: CPT

## 2020-01-28 PROCEDURE — 85610 PROTHROMBIN TIME: CPT

## 2020-01-28 PROCEDURE — 36415 COLL VENOUS BLD VENIPUNCTURE: CPT

## 2020-01-28 PROCEDURE — 2580000003 HC RX 258: Performed by: EMERGENCY MEDICINE

## 2020-01-28 PROCEDURE — 93010 ELECTROCARDIOGRAM REPORT: CPT | Performed by: INTERNAL MEDICINE

## 2020-01-28 RX ORDER — PANTOPRAZOLE SODIUM 40 MG/1
40 TABLET, DELAYED RELEASE ORAL
Status: DISCONTINUED | OUTPATIENT
Start: 2020-01-29 | End: 2020-01-30 | Stop reason: HOSPADM

## 2020-01-28 RX ORDER — BUSPIRONE HYDROCHLORIDE 10 MG/1
10 TABLET ORAL 3 TIMES DAILY
Status: DISCONTINUED | OUTPATIENT
Start: 2020-01-28 | End: 2020-01-30 | Stop reason: HOSPADM

## 2020-01-28 RX ORDER — SODIUM CHLORIDE 9 MG/ML
INJECTION, SOLUTION INTRAVENOUS CONTINUOUS
Status: DISCONTINUED | OUTPATIENT
Start: 2020-01-28 | End: 2020-01-28 | Stop reason: HOSPADM

## 2020-01-28 RX ORDER — SODIUM CHLORIDE 0.9 % (FLUSH) 0.9 %
10 SYRINGE (ML) INJECTION EVERY 12 HOURS SCHEDULED
Status: DISCONTINUED | OUTPATIENT
Start: 2020-01-28 | End: 2020-01-30 | Stop reason: HOSPADM

## 2020-01-28 RX ORDER — NICOTINE 21 MG/24HR
1 PATCH, TRANSDERMAL 24 HOURS TRANSDERMAL DAILY
Status: DISCONTINUED | OUTPATIENT
Start: 2020-01-28 | End: 2020-01-30 | Stop reason: HOSPADM

## 2020-01-28 RX ORDER — ONDANSETRON 2 MG/ML
4 INJECTION INTRAMUSCULAR; INTRAVENOUS EVERY 6 HOURS PRN
Status: DISCONTINUED | OUTPATIENT
Start: 2020-01-28 | End: 2020-01-30 | Stop reason: HOSPADM

## 2020-01-28 RX ORDER — SODIUM CHLORIDE 0.9 % (FLUSH) 0.9 %
10 SYRINGE (ML) INJECTION PRN
Status: DISCONTINUED | OUTPATIENT
Start: 2020-01-28 | End: 2020-01-30 | Stop reason: HOSPADM

## 2020-01-28 RX ORDER — AMLODIPINE BESYLATE 5 MG/1
5 TABLET ORAL DAILY
Status: DISCONTINUED | OUTPATIENT
Start: 2020-01-29 | End: 2020-01-30 | Stop reason: HOSPADM

## 2020-01-28 RX ORDER — SIMVASTATIN 20 MG
20 TABLET ORAL NIGHTLY
Status: DISCONTINUED | OUTPATIENT
Start: 2020-01-28 | End: 2020-01-29

## 2020-01-28 RX ADMIN — BUSPIRONE HYDROCHLORIDE 10 MG: 10 TABLET ORAL at 23:36

## 2020-01-28 RX ADMIN — SODIUM CHLORIDE: 9 INJECTION, SOLUTION INTRAVENOUS at 17:24

## 2020-01-28 RX ADMIN — Medication 10 ML: at 23:36

## 2020-01-28 RX ADMIN — IOPAMIDOL 75 ML: 755 INJECTION, SOLUTION INTRAVENOUS at 16:56

## 2020-01-28 ASSESSMENT — PAIN DESCRIPTION - PAIN TYPE: TYPE: ACUTE PAIN

## 2020-01-28 ASSESSMENT — PAIN SCALES - GENERAL
PAINLEVEL_OUTOF10: 4
PAINLEVEL_OUTOF10: 0
PAINLEVEL_OUTOF10: 0

## 2020-01-28 ASSESSMENT — ENCOUNTER SYMPTOMS
BACK PAIN: 0
SHORTNESS OF BREATH: 0
ABDOMINAL PAIN: 0
CHEST TIGHTNESS: 0

## 2020-01-28 ASSESSMENT — PAIN DESCRIPTION - LOCATION: LOCATION: HEAD

## 2020-01-28 ASSESSMENT — PAIN DESCRIPTION - DESCRIPTORS: DESCRIPTORS: ACHING;DISCOMFORT

## 2020-01-28 ASSESSMENT — PAIN DESCRIPTION - FREQUENCY: FREQUENCY: INTERMITTENT

## 2020-01-28 ASSESSMENT — PAIN DESCRIPTION - ORIENTATION: ORIENTATION: POSTERIOR
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
## 2020-01-28 NOTE — ED PROVIDER NOTES
pain. There were no medications administered prior to arrival.       Nursing Notes were reviewed. REVIEW OF SYSTEMS    (2-9 systems for level 4, 10 or more for level 5)     Review of Systems   Constitutional: Negative for chills and fever. HENT: Negative for congestion. Eyes: Negative for visual disturbance. Respiratory: Negative for chest tightness and shortness of breath. Cardiovascular: Negative for chest pain. Gastrointestinal: Negative for abdominal pain. Musculoskeletal: Negative for back pain and neck pain. Neurological: Negative for dizziness and light-headedness. Psychiatric/Behavioral: Negative for behavioral problems. All other systems reviewed and are negative. Except as noted above the remainder of the review of systems was reviewed and negative. PAST MEDICAL HISTORY     Past Medical History:   Diagnosis Date    Bladder cancer ([facility] 08/17/2016    Brain aneurysm     brain tumor per pt (2013)    CVA (cerebral vascular accident) ([facility]     3 EPISODES.  Disc disease, degenerative, cervical     Hypertension     Meningioma ([facility]     Migraine          SURGICAL HISTORY       Past Surgical History:   Procedure Laterality Date    BLADDER TUMOR EXCISION  08/17/2016    with Dr. Svetlana Sol N/A 08/17/2016     URETHRAL DILATATION, TRANSURETHERAL RESECTION OF LARGE BLADDER TUMOR    CYSTOSCOPY  03/15/2017    URETHRAL DILATION    CYSTOSCOPY  09/13/2014    [facility]    CYSTOSCOPY  09/30/2019    CYSTOSCOPY W BIOPSY OF BLADDER N/A 9/30/2019    CYSTOSCOPY, performed by Abby Kyle MD at [facility]    intractable bleeding    OTHER SURGICAL HISTORY  11/09/2016    cystoscopy bladder biopsy         CURRENT MEDICATIONS       Previous Medications    ALBUTEROL (PROVENTIL HFA) 108 (90 BASE) MCG/ACT INHALER    Inhale 2 puffs into the lungs every 6 hours as needed for Shortness of Breath.  IF YOU FEEL AS THO YOU ARE STILL NEEDING THIS MEDICATION AFTER 10 DAYS, CALL YOUR DR TO DISCUSS FURTHER TREATMENT    AMLODIPINE (NORVASC) 5 MG TABLET    Take 1 tablet by mouth daily. BUSPIRONE (BUSPAR) 10 MG TABLET    Take 10 mg by mouth 3 times daily. OMEPRAZOLE (PRILOSEC) 20 MG DELAYED RELEASE CAPSULE    Take 20 mg by mouth daily    SIMVASTATIN (ZOCOR) 20 MG TABLET    Take 1 tablet by mouth nightly.     WARFARIN (COUMADIN) 10 MG TABLET    Take 5 mg by mouth daily Indications: SUN, MON, WED, FRI     WARFARIN (COUMADIN) 5 MG TABLET    Take 10 mg by mouth daily Indications: T/W/TH/Sat        ALLERGIES     Codeine    FAMILY HISTORY       Family History   Problem Relation Age of Onset    Cancer Mother     Diabetes Mother     Heart Disease Mother     Heart Disease Father           SOCIAL HISTORY       Social History     Socioeconomic History    Marital status: Single     Spouse name: Not on file    Number of children: Not on file    Years of education: Not on file    Highest education level: Not on file   Occupational History    Not on file   Social Needs    Financial resource strain: Not on file    Food insecurity:     Worry: Not on file     Inability: Not on file    Transportation needs:     Medical: Not on file     Non-medical: Not on file   Tobacco Use    Smoking status: Current Every Day Smoker     Packs/day: 1.00     Years: 40.00     Pack years: 40.00     Types: Cigarettes    Smokeless tobacco: Current User   Substance and Sexual Activity    Alcohol use: No    Drug use: No    Sexual activity: Never     Partners: Female   Lifestyle    Physical activity:     Days per week: Not on file     Minutes per session: Not on file    Stress: Not on file   Relationships    Social connections:     Talks on phone: Not on file     Gets together: Not on file     Attends Moravian service: Not on file     Active member of club or organization: Not on file     Attends meetings of clubs or organizations: Not on file     Relationship status: Not on file    Intimate partner violence:     Fear of current or ex partner: Not on file     Emotionally abused: Not on file     Physically abused: Not on file     Forced sexual activity: Not on file   Other Topics Concern    Not on file   Social History Narrative    Not on file       SCREENINGS               PHYSICAL EXAM    (up to 7 for level 4, 8 or more for level 5)     ED Triage Vitals   BP Temp Temp src Pulse Resp SpO2 Height Weight   -- -- -- -- -- -- -- --       Physical Exam  Vitals signs and nursing note reviewed. Constitutional:       Appearance: She is obese. She is not toxic-appearing or diaphoretic. HENT:      Head: Normocephalic and atraumatic. Nose: Nose normal.   Eyes:      General: Lids are normal.         Right eye: No foreign body. Left eye: No foreign body. Extraocular Movements: Extraocular movements intact. Neck:      Musculoskeletal: Full passive range of motion without pain, normal range of motion and neck supple. Thyroid: No thyroid mass or thyromegaly. Vascular: Normal carotid pulses. No carotid bruit, hepatojugular reflux or JVD. Trachea: Trachea normal.      Meningeal: Brudzinski's sign and Kernig's sign absent. Cardiovascular:      Rate and Rhythm: Normal rate. Pulses: Normal pulses. Heart sounds: Normal heart sounds. Heart sounds not distant. No murmur. Pulmonary:      Effort: Pulmonary effort is normal.      Breath sounds: Normal breath sounds. Abdominal:      General: Abdomen is flat. Tenderness: There is no abdominal tenderness. Neurological:      Mental Status: She is oriented to person, place, and time. GCS: GCS eye subscore is 4. GCS verbal subscore is 5. GCS motor subscore is 6. Cranial Nerves: Cranial nerves are intact. Sensory: Sensation is intact. No sensory deficit. Psychiatric:         Behavior: Behavior is cooperative.          DIAGNOSTIC RESULTS     EKG: All EKG's are interpreted by the Emergency Department Physician who either signs or Co-signs this chart in the absence of a cardiologist.        RADIOLOGY:   Non-plain film images such as CT, Ultrasound and MRI are read by the radiologist. Plain radiographic images are visualized and preliminarily interpreted by the emergency physician with the below findings:        Interpretation per the Radiologist below, if available at the time of this note:    No orders to display           LABS:  No results found for this visit on 01/28/20. EMERGENCY DEPARTMENT COURSE and DIFFERENTIAL DIAGNOSIS/MDM:   There were no vitals filed for this visit. MDM      REASSESSMENT      Should be noted the patient is not a stroke candidate first of all patient has no neurological deficits  Patient also is on Coumadin and anticoagulated therefore this is an absolute contraindication for thrombolytic therapy  Patient as mentioned has no stroke symptoms  Patient does have an intracranial mass meningioma  Patient at this point will be transferred to [facility] for further observation and differential of the syncope/unresponsive state  1 does have to consider postictal state possibly from seizure from the meningioma  Also possibly a small new TIA aspect  Possibly a cardiac event even patient was discussed in detail with Dr. Ezio Marshall who graciously except the patient be transferred to [facility] also I made him aware of the very significant stenosis of the left carotid    CRITICAL CARE TIME     CONSULTS:  None      PROCEDURES:     Procedures    MEDICATIONS GIVEN THIS VISIT:  Medications - No data to display     FINAL IMPRESSION      1. Syncope and collapse    2. Stenosis of left carotid artery          The patient at this point did undergo CT and CTAs patient did have appearance of the of the meningioma. Patient also has 90% occlusion of the left carotid artery.   Is hard to discern if this is a new finding or just an exacerbation of an old finding has been described as having 90% thrombosis in the past however whether this is worse or not is hard to understand patient has had no symptoms of chest pain coughing congestion fever denies incontinence of urine or stool fortunately she is come back around till most baseline according to her significant other  She is moving all 4 extremities  She has no neck stiffness  Patient at this point due to the significant carotid disease I felt needs to be revisited also possibility she could have had a grand mal seizure may be from her meningioma I think is a possibility in any respect since this was all kind of unwitnessed patient will be transferred to a more tertiary facility for further work-up and continuous cardiac monitoring  DISPOSITION/PLAN   DISPOSITION    Patient has no ectopy noted      Patient's EKG was interpreted myself without assistance of cardiology rhythm normal sinus  Rate 95  No acute ST-T wave changes  Normal sinus rhythm  CTs were all reviewed    Past Medical History:   Diagnosis Date    Bladder cancer ([address] 08/17/2016    Brain aneurysm     brain tumor per pt (2013)    CVA (cerebral vascular accident) ([address]     3 EPISODES.     Disc disease, degenerative, cervical     Hypertension     Meningioma ([address]     Migraine        Past Surgical History:   Procedure Laterality Date    BLADDER TUMOR EXCISION  08/17/2016    with Dr. Mirta Wang N/A 08/17/2016     URETHRAL DILATATION, TRANSURETHERAL RESECTION OF LARGE BLADDER TUMOR    CYSTOSCOPY  03/15/2017    URETHRAL DILATION    CYST[facility]COPY  09/13/2014    [facility]    CYSTOSCOPY  09/30/2019    CYSTOSCOPY W BIOPSY OF BLADDER N/A 9/30/2019    CYSTOSCOPY, performed by Joellen Lim MD at [address] [place] [address]    intractable bleeding    OTHER SURGICAL HISTORY  11/09/2016    cystoscopy bladder biopsy       Family History   Problem Relation Age of Onset    Cancer Mother     Diabetes Mother     Heart Disease Mother     Heart Disease Father        Social History     Socioeconomic History    Marital status: Single     Spouse name: Not on file    Number of children: Not on file    Years of education: Not on file    Highest education level: Not on file   Occupational History    Not on file   Social Needs    Financial resource strain: Not on file    Food insecurity:     Worry: Not on file     Inability: Not on file    Transportation needs:     Medical: Not on file     Non-medical: Not on file   Tobacco Use    Smoking status: Current Every Day Smoker     Packs/day: 1.00     Years: 40.00     Pack years: 40.00     Types: Cigarettes    Smokeless tobacco: Current User   Substance and Sexual Activity    Alcohol use: No    Drug use: No    Sexual activity: Never     Partners: Female   Lifestyle    Physical activity:     Days per week: Not on file     Minutes per session: Not on file    Stress: Not on file   Relationships    Social connections:     Talks on phone: Not on file     Gets together: Not on file     Attends Yazdanism service: Not on file     Active member of club or organization: Not on file     Attends meetings of clubs or organizations: Not on file     Relationship status: Not on file    Intimate partner violence:     Fear of current or ex partner: Not on file     Emotionally abused: Not on file     Physically abused: Not on file     Forced sexual activity: Not on file   Other Topics Concern    Not on file   Social History Narrative    Not on file       Patient Vitals for the past 24 hrs:   BP Temp Temp src Pulse Resp SpO2 Height Weight   01/28/20 1815 (!) 136/111 -- -- 96 23 94 % -- --   01/28/20 1800 136/80 -- -- 95 21 -- -- --   01/28/20 1745 (!) 116/105 -- -- 93 21 90 % -- --   01/28/20 1730 129/65 -- -- 90 11 94 % -- --   01/28/20 1715 132/77 -- -- 93 25 96 % -- --   01/28/20 1700 134/76 -- -- 93 28 93 % -- --   01/28/20 1646 121/73 98.3 °F (36.8 °C) Oral 99 20 95 % 5' (1.524 m) 145 lb (65.8 kg)           Results Urobilinogen, Urine 0.2 <2.0 E.U./dL    Nitrite, Urine Negative Negative    Leukocyte Esterase, Urine Negative Negative    Microscopic Examination YES     Urine Type NotGiven    Lactic Acid, Plasma   Result Value Ref Range    Lactic Acid 3.4 (H) 0.4 - 2.0 mmol/L   Magnesium   Result Value Ref Range    Magnesium 1.80 1.80 - 2.40 mg/dL   Microscopic Urinalysis   Result Value Ref Range    Mucus, UA 2+ (A) /LPF    WBC, UA 3-5 0 - 5 /HPF    RBC, UA 3-5 (A) 0 - 2 /HPF    Epi Cells 0-2 /HPF    Bacteria, UA Rare (A) /HPF   POCT Glucose   Result Value Ref Range    POC Glucose 121 (H) 70 - 99 mg/dl    Performed on ACCU-CHEK    EKG 12 Lead   Result Value Ref Range    Ventricular Rate 95 BPM    Atrial Rate 95 BPM    P-R Interval 140 ms    QRS Duration 74 ms    Q-T Interval 384 ms    QTc Calculation (Bazett) 482 ms    P Axis 47 degrees    R Axis 57 degrees    T Axis 50 degrees    Diagnosis       Normal sinus rhythmNonspecific ST abnormalityWhen compared with ECG of 13-SEP-2017 11:49,No significant change was foundConfirmed by SUSAN Sparks MD ([id number]) on 1/28/2020 4:49:07 PM       Ct Head Wo Contrast    Result Date: 1/28/2020  EXAMINATION: CT OF THE HEAD WITHOUT CONTRAST  1/28/2020 4:47 pm TECHNIQUE: CT of the head was performed without the administration of intravenous contrast. Dose modulation, iterative reconstruction, and/or weight based adjustment of the mA/kV was utilized to reduce the radiation dose to as low as reasonably achievable. COMPARISON: 10/29/2014 HISTORY: ORDERING SYSTEM PROVIDED HISTORY: HEADACHE TECHNOLOGIST PROVIDED HISTORY: Has a \"code stroke\" or \"stroke alert\" been called? ->No Reason for exam:->HEADACHE Reason for Exam: unresponsive today, hx of stroke, on coumadin, Acuity: Acute Type of Exam: Initial FINDINGS: BRAIN/VENTRICLES: Generalized cerebral atrophy. Heterogeneous calcified extra-axial lesion is seen at the left vertex measuring approximately 1.7 x 1.7 cm and had measured 1.5 x 1.4 cm on prior. Coarse calcification at the left basal ganglia again demonstrated. Gray matter white matter differentiation is preserved. No hydrocephalus. No mass effect or midline shift. No gross acute hemorrhage. Cerebellar atrophy. ORBITS: The visualized portion of the orbits demonstrate no acute abnormality. SINUSES: The visualized paranasal sinuses and mastoid air cells demonstrate no acute abnormality. SOFT TISSUES/SKULL:  Cystic changes seen at the base of several teeth at right and left mandibular row, likely reflecting dental disease. Slight interval increased size of calcified extra-axial lesion at the left vertex, likely reflecting meningioma. No significant mass effect or midline shift. Cerebral and cerebellar atrophy. Xr Chest Portable    Result Date: 1/28/2020  EXAMINATION: ONE XRAY VIEW OF THE CHEST 1/28/2020 4:48 pm COMPARISON: Chest x-ray 10/03/2016 HISTORY: ORDERING SYSTEM PROVIDED HISTORY: PAIN TECHNOLOGIST PROVIDED HISTORY: Reason for exam:->PAIN Reason for Exam: unresponsive today, hx of stroke Acuity: Acute Type of Exam: Initial FINDINGS: Lung volumes are low. Mild pulmonary vascular congestion is noted. No focal intrapulmonary consolidative process is appreciated. No acute bony abnormality identified. Limited single-view low volume study demonstrates no focal consolidation or overt heart failure. Mild basilar opacities, likely reflecting atelectasis related to low lung volumes. Cta Head Neck W Contrast    Result Date: 1/28/2020  EXAMINATION: CTA OF THE HEAD AND NECK WITH CONTRAST 1/28/2020 4:48 pm: TECHNIQUE: CTA of the head and neck was performed with the administration of intravenous contrast. Multiplanar reformatted images are provided for review. MIP images are provided for review. Stenosis of the internal carotid arteries measured using NASCET criteria.  Dose modulation, iterative reconstruction, and/or weight based adjustment of the mA/kV was utilized to reduce the radiation

## 2020-01-29 ENCOUNTER — APPOINTMENT (OUTPATIENT)
Dept: MRI IMAGING | Age: 65
DRG: 312 | End: 2020-01-29
Attending: INTERNAL MEDICINE
Payer: MEDICARE

## 2020-01-29 ENCOUNTER — APPOINTMENT (OUTPATIENT)
Dept: VASCULAR LAB | Age: 65
DRG: 312 | End: 2020-01-29
Attending: INTERNAL MEDICINE
Payer: MEDICARE

## 2020-01-29 LAB
ANION GAP SERPL CALCULATED.3IONS-SCNC: 13 MMOL/L (ref 3–16)
BASOPHILS ABSOLUTE: 0 K/UL (ref 0–0.2)
BASOPHILS RELATIVE PERCENT: 0 %
BUN BLDV-MCNC: 11 MG/DL (ref 7–20)
CALCIUM SERPL-MCNC: 9 MG/DL (ref 8.3–10.6)
CHLORIDE BLD-SCNC: 106 MMOL/L (ref 99–110)
CO2: 22 MMOL/L (ref 21–32)
CREAT SERPL-MCNC: 0.6 MG/DL (ref 0.6–1.2)
EOSINOPHILS ABSOLUTE: 0.3 K/UL (ref 0–0.6)
EOSINOPHILS RELATIVE PERCENT: 2 %
GFR AFRICAN AMERICAN: >60
GFR NON-AFRICAN AMERICAN: >60
GLUCOSE BLD-MCNC: 107 MG/DL (ref 70–99)
HCT VFR BLD CALC: 42.8 % (ref 36–48)
HEMATOLOGY PATH CONSULT: NORMAL
HEMATOLOGY PATH CONSULT: YES
HEMOGLOBIN: 14.4 G/DL (ref 12–16)
INR BLD: 3.23 (ref 0.86–1.14)
LACTIC ACID: 2.2 MMOL/L (ref 0.4–2)
LV EF: 58 %
LVEF MODALITY: NORMAL
LYMPHOCYTES ABSOLUTE: 6.6 K/UL (ref 1–5.1)
LYMPHOCYTES RELATIVE PERCENT: 42 %
MCH RBC QN AUTO: 30.1 PG (ref 26–34)
MCHC RBC AUTO-ENTMCNC: 33.6 G/DL (ref 31–36)
MCV RBC AUTO: 89.7 FL (ref 80–100)
MONOCYTES ABSOLUTE: 0.6 K/UL (ref 0–1.3)
MONOCYTES RELATIVE PERCENT: 4 %
NEUTROPHILS ABSOLUTE: 8.2 K/UL (ref 1.7–7.7)
NEUTROPHILS RELATIVE PERCENT: 52 %
PDW BLD-RTO: 14.2 % (ref 12.4–15.4)
PLATELET # BLD: 385 K/UL (ref 135–450)
PMV BLD AUTO: 8.5 FL (ref 5–10.5)
POTASSIUM REFLEX MAGNESIUM: 3.9 MMOL/L (ref 3.5–5.1)
PROTHROMBIN TIME: 37.9 SEC (ref 10–13.2)
RBC # BLD: 4.77 M/UL (ref 4–5.2)
SODIUM BLD-SCNC: 141 MMOL/L (ref 136–145)
TROPONIN: <0.01 NG/ML
URINE CULTURE, ROUTINE: NORMAL
WBC # BLD: 15.7 K/UL (ref 4–11)

## 2020-01-29 PROCEDURE — 2580000003 HC RX 258: Performed by: INTERNAL MEDICINE

## 2020-01-29 PROCEDURE — G0378 HOSPITAL OBSERVATION PER HR: HCPCS

## 2020-01-29 PROCEDURE — 72156 MRI NECK SPINE W/O & W/DYE: CPT

## 2020-01-29 PROCEDURE — 36415 COLL VENOUS BLD VENIPUNCTURE: CPT

## 2020-01-29 PROCEDURE — 95819 EEG AWAKE AND ASLEEP: CPT

## 2020-01-29 PROCEDURE — 80048 BASIC METABOLIC PNL TOTAL CA: CPT

## 2020-01-29 PROCEDURE — 93880 EXTRACRANIAL BILAT STUDY: CPT

## 2020-01-29 PROCEDURE — 70553 MRI BRAIN STEM W/O & W/DYE: CPT

## 2020-01-29 PROCEDURE — 6370000000 HC RX 637 (ALT 250 FOR IP): Performed by: HOSPITALIST

## 2020-01-29 PROCEDURE — 93306 TTE W/DOPPLER COMPLETE: CPT

## 2020-01-29 PROCEDURE — 6370000000 HC RX 637 (ALT 250 FOR IP): Performed by: INTERNAL MEDICINE

## 2020-01-29 PROCEDURE — 85610 PROTHROMBIN TIME: CPT

## 2020-01-29 PROCEDURE — 6360000004 HC RX CONTRAST MEDICATION: Performed by: INTERNAL MEDICINE

## 2020-01-29 PROCEDURE — 2060000000 HC ICU INTERMEDIATE R&B

## 2020-01-29 RX ORDER — ATORVASTATIN CALCIUM 80 MG/1
80 TABLET, FILM COATED ORAL NIGHTLY
Status: DISCONTINUED | OUTPATIENT
Start: 2020-01-29 | End: 2020-01-30 | Stop reason: HOSPADM

## 2020-01-29 RX ORDER — SIMVASTATIN 20 MG
20 TABLET ORAL DAILY
COMMUNITY

## 2020-01-29 RX ORDER — WARFARIN SODIUM 10 MG/1
10 TABLET ORAL
COMMUNITY

## 2020-01-29 RX ORDER — ACETAMINOPHEN 500 MG
500 TABLET ORAL EVERY 6 HOURS PRN
COMMUNITY

## 2020-01-29 RX ORDER — AMITRIPTYLINE HYDROCHLORIDE 10 MG/1
20 TABLET, FILM COATED ORAL NIGHTLY
COMMUNITY

## 2020-01-29 RX ORDER — WARFARIN SODIUM 5 MG/1
5 TABLET ORAL
COMMUNITY

## 2020-01-29 RX ADMIN — AMLODIPINE BESYLATE 5 MG: 5 TABLET ORAL at 09:47

## 2020-01-29 RX ADMIN — Medication 10 ML: at 20:28

## 2020-01-29 RX ADMIN — BUSPIRONE HYDROCHLORIDE 10 MG: 10 TABLET ORAL at 17:22

## 2020-01-29 RX ADMIN — PANTOPRAZOLE SODIUM 40 MG: 40 TABLET, DELAYED RELEASE ORAL at 06:48

## 2020-01-29 RX ADMIN — Medication 10 ML: at 09:47

## 2020-01-29 RX ADMIN — PERFLUTREN 1.65 MG: 6.52 INJECTION, SUSPENSION INTRAVENOUS at 13:13

## 2020-01-29 RX ADMIN — BUSPIRONE HYDROCHLORIDE 10 MG: 10 TABLET ORAL at 09:47

## 2020-01-29 RX ADMIN — ATORVASTATIN CALCIUM 80 MG: 80 TABLET, FILM COATED ORAL at 20:28

## 2020-01-29 RX ADMIN — BUSPIRONE HYDROCHLORIDE 10 MG: 10 TABLET ORAL at 20:28

## 2020-01-29 ASSESSMENT — PAIN SCALES - GENERAL
PAINLEVEL_OUTOF10: 0

## 2020-01-29 NOTE — CONSULTS
tooth pain, per partner, and prefers to take acetaminophen for this. Patient currently has nothing ordered for pain - would recommend addition of acetaminophen 650 mg q4h PRN pain. · Recommend adjusting buspirone dose to 10 mg BID (patient's home dose). · Patient is heavy smoker (2 PPD). Recommend increasing nicotine patch to 21 mg/24h. Please call with any questions.     Thanks for consulting pharmacy --   Isabella Hussein PharmD, BCPS  1/29/2020 12:40 PM  Wireless: x [phone]

## 2020-01-29 NOTE — PROCEDURES
Name: Adonay Wilhelm  MRN: [id number]  : 1955  Interpreting Physician: Gutierrez Gamez MD  Referring Physician: Malou Giang MD  Date of EE/29    Clinical History:  Adonay Wilhelm is a 75 y.o. female with a reported history of spell of transient loss of awareness who was referred for EEG. Current Antiepileptic Medications:    warfarin (COUMADIN) daily dosing (placeholder)   Other See Admin Instructions    atorvastatin  80 mg Oral Nightly    amLODIPine  5 mg Oral Daily    busPIRone  10 mg Oral TID    pantoprazole  40 mg Oral QAM AC    sodium chloride flush  10 mL Intravenous 2 times per day    nicotine  1 patch Transdermal Daily       Indication:  seizure    Technical Summary:  20 channels of EEG were recorded in a digital format on a patient who was reported to be awake and drowsy stat during the recording. The patient was not sleep deprived prior to the EEG. The recording revealed a normal background with a well-developed alpha frequency rhythm that was most prominent in the posterior head region and was reactive to eye opening and closure. Photic stimulation revealed an excellent occipital driving response. During the recording stage II sleep was not seen. The EKG lead revealed no rhythm abnormalities. EEG Interpretation: This EEG was within normal limits     Clinical correlation is recommended as a normal routine EEG does not rule out a diagnosis of epilepsy.

## 2020-01-29 NOTE — PROGRESS NOTES
Hospitalist Progress Note      PCP: Nathaniel Bowman PA-C    Date of Admission: 1/28/2020    Chief Complaint: Syncope    Hospital Course: This is a 24-year-old female admitted with a syncope history of a CVA in 2014, meningioma in the emergency room CT of the head negative left internal carotid artery 90% stenosis neurology consulted recommending a vascular surgery consultation    Subjective: Patient denies any chest pain no shortness of breath no cough no sputum no nausea vomiting. Medications:  Reviewed    Infusion Medications   Scheduled Medications    amLODIPine  5 mg Oral Daily    busPIRone  10 mg Oral TID    pantoprazole  40 mg Oral QAM AC    simvastatin  20 mg Oral Nightly    sodium chloride flush  10 mL Intravenous 2 times per day    nicotine  1 patch Transdermal Daily     PRN Meds: sodium chloride flush, magnesium hydroxide, ondansetron, perflutren lipid microspheres      Intake/Output Summary (Last 24 hours) at 1/29/2020 0941  Last data filed at 1/29/2020 0648  Gross per 24 hour   Intake --   Output 1050 ml   Net -1050 ml       Physical Exam Performed:    BP (!) 143/85   Pulse 77   Temp 97.7 °F (36.5 °C) (Oral)   Resp 16   Ht 4' 11\" (1.499 m)   Wt 137 lb 5.6 oz (62.3 kg)   SpO2 92%   BMI 27.74 kg/m²     General appearance: No apparent distress, appears stated age and cooperative. HEENT: Pupils equal, round, and reactive to light. Conjunctivae/corneas clear. Neck: Supple, with full range of motion. No jugular venous distention. Trachea midline. Respiratory:  Normal respiratory effort. Clear to auscultation, bilaterally without Rales/Wheezes/Rhonchi. Cardiovascular: Regular rate and rhythm with normal S1/S2 without murmurs, rubs or gallops. Abdomen: Soft, non-tender, non-distended with normal bowel sounds. Musculoskeletal: No clubbing, cyanosis or edema bilaterally. Full range of motion without deformity.   Skin: Skin color, texture, turgor normal.  No rashes or lesions. Neurologic:  Neurovascularly intact without any focal sensory/motor deficits. Cranial nerves: II-XII intact, grossly non-focal.  Psychiatric: Alert and oriented, thought content appropriate, normal insight  Capillary Refill: Brisk,< 3 seconds   Peripheral Pulses: +2 palpable, equal bilaterally       Labs:   Recent Labs     01/28/20  1634   WBC 15.7*   HGB 14.4   HCT 42.8        Recent Labs     01/28/20  1634 01/29/20  0440    141   K 3.4* 3.9    106   CO2 21 22   BUN 16 11   CREATININE 0.9 0.6   CALCIUM 9.0 9.0     Recent Labs     01/28/20  1634   AST 11*   ALT 12   BILITOT <0.2   ALKPHOS 127     Recent Labs     01/28/20  1634 01/29/20  0841   INR 2.55* 3.23*     Recent Labs     01/28/20  1634 01/28/20  2356   TROPONINI <0.01 <0.01       Urinalysis:      Lab Results   Component Value Date    NITRU Negative 01/28/2020    WBCUA 3-5 01/28/2020    BACTERIA Rare 01/28/2020    RBCUA 3-5 01/28/2020    BLOODU TRACE-INTACT 01/28/2020    SPECGRAV 1.015 01/28/2020    GLUCOSEU Negative 01/28/2020       Radiology:  MRI BRAIN W WO CONTRAST    (Results Pending)   MRI CERVICAL SPINE W WO CONTRAST    (Results Pending)           Assessment/Plan:    Active Hospital Problems    Diagnosis    Syncope [R55]     Priority: High     Class: Acute     1. This 64-year-old female admitted with a syncope significant left internal carotid stenosis neurology consult appreciated continue with aspirin high-dose statin consult vascular surgery. Check 2D echo MRI of the brain today  1. No acute infarction or intracranial hemorrhage   2. Stable left frontal meningioma 1.6 x 1.7 x 1.5 cm without brain edema   3. Chronic infarctions involving the right cerebellum       2. Hypertension continue with home medication. 3.  Hyperlipidemia on statin. 4.  Tobacco abuse recommended patient to stop smoking  5.   Chronic anticoagulation with Coumadin therapeutic INR    DVT Prophylaxis: Coumadin  Diet: DIET LOW SODIUM 2 GM;  Code

## 2020-01-29 NOTE — PROGRESS NOTES
Patient admitted to room 4458 from [place] [address]. Transferred to bed as a SBA. Oriented to room, call light, phone, TV, thermostat, bed controls, bathroom, emergency cord, white board, therapy times, unit routine, visiting hours,  and meal times. Patient verbalized understanding. Call light and personal items in reach, alarms active and in place.

## 2020-01-29 NOTE — ED NOTES
4458.1 at [facility], report to [phone], [employer] E[facility] 90 mins     Maria Luz Day  01/28/20 1947

## 2020-01-29 NOTE — ED NOTES
[facility] hospitalist paged via transfer center x2     Maria Luz Day  01/28/20 [id number]

## 2020-01-29 NOTE — CONSULTS
years now. She also indicates she has had multiple strokes in the past and hence why she takes Coumadin. An MRI of the brain completed 3/18/2014 does show acute right cerebellar ischemic infarctions. A CT angiogram of the head and neck revealed no large vessel occlusion of the intracranial arteries but did show a chronic appearing occlusion of the left common carotid artery terminus and severe diffuse stenosis of the left internal carotid artery greater than 90% as well as bilateral vertebral artery stenosis and moderate stenosis of the proximal left subclavian artery. The patient reports for the last 2 months she has been having episodes in which her arms become numb with tingling she then experiences severe neck pain in the back which shoots up to her head and circles around to her left eye causing blurred vision and squiggly lines within her right eye visual field. She states when she gets these episodes she feels like she is going to pass out so she immediately goes and lays down on the bed. These episodes last for approximately several minutes with the longest episode lasting 15 minutes. She endorses dizziness and mild confusion with these episodes. She has had 4 of these episodes in total but yesterdays episode is the only time she has lost consciousness. Initial lab work showed an lactic acid level of 3.4, K+ of 3.4, WBC 15.7 and therapeutic INR of 2.55      Past Medical History:   has a past medical history of Bladder cancer ([address] Brain aneurysm, CVA (cerebral vascular accident) ([address] Disc disease, degenerative, cervical, Hypertension, Meningioma ([address] and Migraine.   Patient Active Problem List   Diagnosis    Calcified cerebral meningioma ([address]    Migraine    Cerebral embolism with cerebral infarction ([address]    Vertebral artery syndrome    Essential hypertension    Other and unspecified hyperlipidemia    Syncope    Ovary, torsion    Pelvic pain in female    Meningioma ([address]  Hypertension    Disc disease, degenerative, cervical    CVA (cerebral vascular accident) ([address]    Brain aneurysm    Pelvic mass in female (10.3 x 9.6 cm on CT scan)    LONG TERM ANTICOAGULENT USE    Bladder cancer ([address]    Right tubo-ovarian mass       Past Surgical History:  Past Surgical History:   Procedure Laterality Date    BLADDER TUMOR EXCISION  08/17/2016    with Dr. Mirta Wang N/A 08/17/2016     URETHRAL DILATATION, TRANSURETHERAL RESECTION OF LARGE BLADDER TUMOR    CYSTOSCOPY  03/15/2017    URETHRAL DILATION    CYSTOSCOPY  09/13/2014    [facility]    CYSTOSCOPY  09/30/2019    CYSTOSCOPY W BIOPSY OF BLADDER N/A 9/30/2019    CYSTOSCOPY, performed by Joellen Lim MD at [place] [address]    intractable bleeding    OTHER SURGICAL HISTORY  11/09/2016    cystoscopy bladder biopsy       Family History:  family history includes Cancer in her mother; Diabetes in her mother; Heart Disease in her father and mother. Social History:  she reports that she has been smoking cigarettes. She has a 40.00 pack-year smoking history. She uses smokeless tobacco. She reports that she does not drink alcohol or use drugs.     Social History     Socioeconomic History    Marital status: Single     Spouse name: Not on file    Number of children: Not on file    Years of education: Not on file    Highest education level: Not on file   Occupational History    Not on file   Social Needs    Financial resource strain: Not on file    Food insecurity:     Worry: Not on file     Inability: Not on file    Transportation needs:     Medical: Not on file     Non-medical: Not on file   Tobacco Use    Smoking status: Current Every Day Smoker     Packs/day: 1.00     Years: 40.00     Pack years: 40.00     Types: Cigarettes    Smokeless tobacco: Current User   Substance and Sexual Activity    Alcohol use: No    Drug use: No    Sexual activity: Never     Partners: atrophy. CTA of the  Head/Neck:   1. No large vessel occlusion of the intracranial arteries. 2. Chronic appearing occlusion of the left common carotid artery terminus and   severe diffuse stenosis of the left internal carotid artery with only a thin   string of contrast indicating greater than 90% stenosis. 3. No significant right internal carotid artery stenosis. 4. Bilateral vertebral artery stenosis, moderate on the right and mild on the   left. 5. Moderate stenosis of the proximal left subclavian artery.               All reports below personally reviewed & actual images reviewed where indicated. Pertinent positives & negatives are addressed in Assessment & Plan section of note      Laboratory Review: All results below personally reviewed.  Pertinent positives & negatives are addressed in Assessment & Plan section of note  Recent Results (from the past 36 hour(s))   CBC Auto Differential    Collection Time: 01/28/20  4:34 PM   Result Value Ref Range    WBC 15.7 (H) 4.0 - 11.0 K/uL    RBC 4.77 4.00 - 5.20 M/uL    Hemoglobin 14.4 12.0 - 16.0 g/dL    Hematocrit 42.8 36.0 - 48.0 %    MCV 89.7 80.0 - 100.0 fL    MCH 30.1 26.0 - 34.0 pg    MCHC 33.6 31.0 - 36.0 g/dL    RDW 14.2 12.4 - 15.4 %    Platelets 258 421 - 719 K/uL    MPV 8.5 5.0 - 10.5 fL    Path Consult Yes     Neutrophils % 52.0 %    Lymphocytes % 42.0 %    Monocytes % 4.0 %    Eosinophils % 2.0 %    Basophils % 0.0 %    Neutrophils Absolute 8.2 (H) 1.7 - 7.7 K/uL    Lymphocytes Absolute 6.6 (H) 1.0 - 5.1 K/uL    Monocytes Absolute 0.6 0.0 - 1.3 K/uL    Eosinophils Absolute 0.3 0.0 - 0.6 K/uL    Basophils Absolute 0.0 0.0 - 0.2 K/uL   Comprehensive Metabolic Panel    Collection Time: 01/28/20  4:34 PM   Result Value Ref Range    Sodium 142 136 - 145 mmol/L    Potassium 3.4 (L) 3.5 - 5.1 mmol/L    Chloride 105 99 - 110 mmol/L    CO2 21 21 - 32 mmol/L    Anion Gap 16 3 - 16    Glucose 120 (H) 70 - 99 mg/dL    BUN 16 7 - 20 mg/dL Hx of cerebral infarction  5. HTN  6. Tobacco use     ==============  RECOMMENDATIONS:  1. MRI of the brain wo/w contrast.   2. MRI of the cervical spine wo/w contrast.   3. EEG to assess or any epileptogenic features. 4. Vascular consult in reference to high grade left ICA stenosis  5. Continue statin therapy. 6.  Patient's Coumadin has been placed on hold. 7.  Echocardiogram with bubble study  8. Patient instructed and advised on smoking cessation. 9. Would not start AED at this time until the above workup completed. Discussed at length with patient findings and recommendations.     A copy of this note was provided for Dr Vivek Cid MD     Electronically signed by:    Uzair ABARCA-[address] Neuro[facility]  [phone]     1/29/2020,6:55 AM

## 2020-01-29 NOTE — CARE COORDINATION
CM attempted to meet with pt at bedside. Pt off unit most of day for testing. Chart review completed. Pt from home with Significant Other. Pt has had [facility] in the past. CM to attempt to meet with pt 1/30/19.     Mj Guillaume RN, BSN, [address]  Case Management Department  [phone]

## 2020-01-29 NOTE — H&P
HOSPITALISTS HISTORY AND PHYSICAL    1/28/2020 11:02 PM    Patient Information:  Radha Trinidad is a 75 y.o. female [id number]  PCP:  Sarika Sharp PA-C (Tel: [phone] )    Chief complaint:  No chief complaint on file. passing out     History of Present Illness:  Tai Nunez is a 75 y.o. female who transfer from [facility] ER, patient mentioned that today in the morning was feeling cold he she heated her truck and felt warm and after that she passed out her partner was with her and 9 1 was called she does mention that she was little confused after this episode. She does mention that having these kind of symptoms. Mention that pending does worsens her symptoms. She does have a history of stroke in 2014, she also had a history of meningioma. Investigation in the ER does reveal that she has chronic occlusion in the left common carotid and 90% in the left internal carotid. She does continue to smoke. She does has slight worsening in the size of meningioma. Old medical records show   Occluded left common carotid artery, 2014 imaging at [address] [place]:   All other ROS negative except mentioned in [place]      Past Medical History:   has a past medical history of Bladder cancer ([facility] Brain aneurysm, CVA (cerebral vascular accident) ([address] Disc disease, degenerative, cervical, Hypertension, Meningioma ([address] and Migraine. Past Surgical History:   has a past surgical history that includes Cystoscopy (N/A, 08/17/2016); Colonoscopy; bladder tumor excision (08/17/2016); Hysterectomy, vaginal (1992); other surgical history (11/09/2016); Cystocopy (03/15/2017); Cystoscopy (09/13/2014); Cystoscopy (09/30/2019); and cystoscopy w biopsy of bladder (N/A, 9/30/2019). Medications:  No current facility-administered medications on file prior to encounter.       Current Outpatient

## 2020-01-29 NOTE — CONSULTS
Vascular Surgery   Resident Consult Note      Chief Complaint: L comm    History obtained from:     History of Present Illness :    Jeanie Saez is a 75 y.o. female with a history of HTN, CVA/TIA, meningioma (since the 80's), and migrainess who was admitted for a syncopal event yesterday. She states that she was working outside when she began to feel cold and went to her truck to warm up. She remembers opening the door and waking up on the ground. She was taken to [facility] ED when she underwent a CTA of the head and neck demonstrating 25% stenosis of the right common carotid artery and near to complete occlusion (>90%) of left common carotid. Patient on exam denied symptoms and was neurologically intact. Per patient, this is a known lesion since 2013/2014 and is followed by her vascular [place] [facility] [place] she has not seen him since June due to loss of insurance. She says that she has not had many symptoms in the past other than pain in her left eye and blurry vision in her right eye associated with severe headaches. For the past 2-3 months she states that she has had neck pain with bilateral upper extremity and bilateral lower extremity tingling that progresses to weakness that resolves with rest that occurs approximately every 2 weeks. She is currently experiencing this sensation on exam and has been present since yesterday which is unchanged from the onset of her symptoms. The patient states that she also has sensation of dizziness similar to when she stands up too quickly that is relieved by rest. At the time of her syncopal episode yesterday, she felt this sensation but was not able to sit down quickly enough. She states that she has a history of 4 prior \"strokes\" for which she takes coumadin. Past Medical History:        Diagnosis Date    Bladder cancer ([facility] 08/17/2016    Brain aneurysm     brain tumor per pt (2013)    CVA (cerebral vascular accident) ([facility]     3 EPISODES.     Disc disease, degenerative, cervical     Hypertension     M[facility] ([facility]     Migraine        Past Surgical History:           Procedure Laterality Date    BLADDER TUMOR EXCISION  08/17/2016    with Dr. Jerry Warner N/A 08/17/2016     URETHRAL DILATATION, TRANSURETHERAL RESECTION OF LARGE BLADDER TUMOR    CYSTOSCOPY  03/15/2017    URETHRAL DILATION    CYSTOSCOPY  09/13/2014    [facility]    CYSTOSCOPY  09/30/2019    CYSTOSCOPY W BIOPSY OF BLADDER N/A 9/30/2019    CYSTOSCOPY, performed by Maycol Alexis MD at [facility]    intractable bleeding    OTHER SURGICAL HISTORY  11/09/2016    cystoscopy bladder biopsy       Allergies:  Codeine    Medications:   Home Meds  No current facility-administered medications on file prior to encounter. Current Outpatient Medications on File Prior to Encounter   Medication Sig Dispense Refill    omeprazole (PRILOSEC) 20 MG delayed release capsule Take 20 mg by mouth daily      warfarin (COUMADIN) 10 MG tablet Take 5 mg by mouth daily Indications: SUN, MON, WED, FRI       warfarin (COUMADIN) 5 MG tablet Take 10 mg by mouth daily Indications: T/W/TH/Sat       busPIRone (BUSPAR) 10 MG tablet Take 10 mg by mouth 3 times daily.  simvastatin (ZOCOR) 20 MG tablet Take 1 tablet by mouth nightly. 30 tablet 0    amLODIPine (NORVASC) 5 MG tablet Take 1 tablet by mouth daily. 30 tablet 0    albuterol (PROVENTIL HFA) 108 (90 BASE) MCG/ACT inhaler Inhale 2 puffs into the lungs every 6 hours as needed for Shortness of Breath.  IF YOU FEEL AS THO YOU ARE STILL NEEDING THIS MEDICATION AFTER 10 DAYS, CALL YOUR DR TO DISCUSS FURTHER TREATMENT 1 Inhaler 0     Current Meds  amLODIPine (NORVASC) tablet 5 mg, Daily  busPIRone (BUSPAR) tablet 10 mg, TID  pantoprazole (PROTONIX) tablet 40 mg, QAM AC  simvastatin (ZOCOR) tablet 20 mg, Nightly  sodium chloride flush 0.9 % injection 10 mL, 2 times per day  sodium chloride flush 0.9 % injection 10 mL, PRN  magnesium hydroxide (MILK OF MAGNESIA) 400 MG/5ML suspension 30 mL, Daily PRN  ondansetron (ZOFRAN) injection 4 mg, Q6H PRN  nicotine (NICODERM CQ) 14 MG/24HR 1 patch, Daily  perflutren lipid microspheres (DEFINITY) injection 1.65 mg, ONCE PRN        Family History:   Family History   Problem Relation Age of Onset    Cancer Mother     Diabetes Mother     Heart Disease Mother     Heart Disease Father        Social History:   TOBACCO:   reports that she has been smoking cigarettes. She has a 40.00 pack-year smoking history. She uses smokeless tobacco.  ETOH:   reports no history of alcohol use. DRUGS:   reports no history of drug use. ROS: A 10 point review of systems was conducted, significant findings as noted in HPI. Constitutional: Negative. HENT: Neck pain  Eyes: Occasional L eye pain, R eye blurriness. Respiratory: Negative. Cardiovascular: Negative. Gastrointestinal: Negative. Genitourinary: Negative. Musculoskeletal: Bilateral upper and lower numbness and weakness. Skin: Negative. Endocrine: Negative. Allergic/Immunologic: Negative. Neurological: Negative. Hematological: Negative. Psychiatric/Behavioral: Negative.       Physical exam:    Vitals:    01/29/20 0739   BP: (!) 143/85   Pulse: 77   Resp: 16   Temp: 97.7 °F (36.5 °C)   SpO2: 92%       General appearance: alert, no acute distress, grooming appropriate  Eyes: EOMI, no scleral icterus  Neck: trachea midline, no JVD, no bruits  Chest/Lungs: CTAB, no crackles/rales, wheezes/rhonchi, normal effort, on RA  Cardiovascular: RRR, no murmurs/gallops/rubs  Abdomen: soft, non-tender, non-distended, +BS, no guarding/rigidity  Skin: warm and dry, no rashes  Extremities: no edema, no cyanosis  Neuro: A&Ox3, no focal deficits, sensation intact, CN 2-12 intact    Pulses    PT DP   R + +   L + +    +, +/- ,-/-     Labs:    CBC:   Recent Labs     01/28/20  1634   WBC 15.7*   HGB 14.4   HCT 42.8   MCV 89.7    BMP:   Recent Labs     01/28/20  1634 01/29/20  0440    141   K 3.4* 3.9    106   CO2 21 22   BUN 16 11   CREATININE 0.9 0.6     PT/INR:   Recent Labs     01/28/20  1634 01/29/20  0841   PROTIME 28.8* 37.9*   INR 2.55* 3.23*     APTT: No results for input(s): APTT in the last 72 hours. Liver Profile:  Lab Results   Component Value Date    AST 11 01/28/2020    ALT 12 01/28/2020    BILIDIR <0.2 10/03/2016    BILITOT <0.2 01/28/2020    ALKPHOS 127 01/28/2020     Lab Results   Component Value Date    CHOL 152 03/20/2014    HDL 37 03/20/2014    TRIG 129 03/20/2014     UA:   Lab Results   Component Value Date    COLORU Yellow 01/28/2020    PHUR 6.0 01/28/2020    WBCUA 3-5 01/28/2020    RBCUA 3-5 01/28/2020    MUCUS 2+ 01/28/2020    BACTERIA Rare 01/28/2020    CLARITYU SL CLOUDY 01/28/2020    SPECGRAV 1.015 01/28/2020    LEUKOCYTESUR Negative 01/28/2020    UROBILINOGEN 0.2 01/28/2020    BILIRUBINUR Negative 01/28/2020    BLOODU TRACE-INTACT 01/28/2020    GLUCOSEU Negative 01/28/2020    AMORPHOUS 1+ 04/14/2014     Assessment/Plan:  75 y.o. female HTN, CVA, and meningioma who was admitted for a syncopal event yesterday. Labs are significant for lactic acid of 2.2, elevated PT/INR of 37.9/3. 23. CTA of the head and neck demonstrate complete occlusion of the left common carotid artery with distal revascularization likely due to collateral circulation. Cranial nerves 2-12 intact. Patient is currently asymptomatic and has an established vascular surgeon. It is unlikely that her syncopal episode is related to the stenosis in her left common carotid as her CTA is unchanged from prior imaging studies and per her vascular surgeon. Additionally, the CTA from 1/28 demonstrates flow to the external and internal carotid arteries and her symptoms are bilateral which may suggest more of a neurologic origin.     - no surgical intervention indicated at this time  - recommend to follow up with her established vascular

## 2020-01-30 VITALS
HEIGHT: 59 IN | DIASTOLIC BLOOD PRESSURE: 75 MMHG | WEIGHT: 137.35 LBS | BODY MASS INDEX: 27.69 KG/M2 | OXYGEN SATURATION: 96 % | HEART RATE: 113 BPM | TEMPERATURE: 99.7 F | RESPIRATION RATE: 14 BRPM | SYSTOLIC BLOOD PRESSURE: 135 MMHG

## 2020-01-30 LAB
HCT VFR BLD CALC: 44.1 % (ref 36–48)
HEMOGLOBIN: 14.3 G/DL (ref 12–16)
INR BLD: 2.22 (ref 0.86–1.14)
MCH RBC QN AUTO: 30.3 PG (ref 26–34)
MCHC RBC AUTO-ENTMCNC: 32.5 G/DL (ref 31–36)
MCV RBC AUTO: 93.1 FL (ref 80–100)
PDW BLD-RTO: 14.8 % (ref 12.4–15.4)
PLATELET # BLD: 359 K/UL (ref 135–450)
PMV BLD AUTO: 8.9 FL (ref 5–10.5)
PROTHROMBIN TIME: 26 SEC (ref 10–13.2)
RBC # BLD: 4.73 M/UL (ref 4–5.2)
WBC # BLD: 13.4 K/UL (ref 4–11)

## 2020-01-30 PROCEDURE — 97165 OT EVAL LOW COMPLEX 30 MIN: CPT

## 2020-01-30 PROCEDURE — 6370000000 HC RX 637 (ALT 250 FOR IP): Performed by: INTERNAL MEDICINE

## 2020-01-30 PROCEDURE — 85610 PROTHROMBIN TIME: CPT

## 2020-01-30 PROCEDURE — 97116 GAIT TRAINING THERAPY: CPT

## 2020-01-30 PROCEDURE — 2580000003 HC RX 258: Performed by: INTERNAL MEDICINE

## 2020-01-30 PROCEDURE — 97530 THERAPEUTIC ACTIVITIES: CPT

## 2020-01-30 PROCEDURE — 97162 PT EVAL MOD COMPLEX 30 MIN: CPT

## 2020-01-30 PROCEDURE — 85027 COMPLETE CBC AUTOMATED: CPT

## 2020-01-30 PROCEDURE — 94150 VITAL CAPACITY TEST: CPT

## 2020-01-30 PROCEDURE — 36415 COLL VENOUS BLD VENIPUNCTURE: CPT

## 2020-01-30 PROCEDURE — 97535 SELF CARE MNGMENT TRAINING: CPT

## 2020-01-30 RX ORDER — DOXYCYCLINE HYCLATE 100 MG
100 TABLET ORAL 2 TIMES DAILY
Qty: 10 TABLET | Refills: 0 | Status: SHIPPED | OUTPATIENT
Start: 2020-01-30 | End: 2020-02-02

## 2020-01-30 RX ORDER — WARFARIN SODIUM 5 MG/1
5 TABLET ORAL
Status: DISCONTINUED | OUTPATIENT
Start: 2020-01-31 | End: 2020-01-30 | Stop reason: HOSPADM

## 2020-01-30 RX ORDER — FLUTICASONE PROPIONATE 50 MCG
1 SPRAY, SUSPENSION (ML) NASAL DAILY
Qty: 1 BOTTLE | Refills: 3 | Status: SHIPPED | OUTPATIENT
Start: 2020-01-30

## 2020-01-30 RX ORDER — IPRATROPIUM BROMIDE AND ALBUTEROL SULFATE 2.5; .5 MG/3ML; MG/3ML
1 SOLUTION RESPIRATORY (INHALATION)
Status: DISCONTINUED | OUTPATIENT
Start: 2020-01-30 | End: 2020-01-30

## 2020-01-30 RX ORDER — WARFARIN SODIUM 5 MG/1
10 TABLET ORAL
Status: DISCONTINUED | OUTPATIENT
Start: 2020-01-30 | End: 2020-01-30 | Stop reason: HOSPADM

## 2020-01-30 RX ORDER — NICOTINE 21 MG/24HR
1 PATCH, TRANSDERMAL 24 HOURS TRANSDERMAL DAILY
Qty: 30 PATCH | Refills: 3 | Status: SHIPPED | OUTPATIENT
Start: 2020-01-31

## 2020-01-30 RX ORDER — IPRATROPIUM BROMIDE AND ALBUTEROL SULFATE 2.5; .5 MG/3ML; MG/3ML
1 SOLUTION RESPIRATORY (INHALATION) EVERY 4 HOURS PRN
Status: DISCONTINUED | OUTPATIENT
Start: 2020-01-30 | End: 2020-01-30 | Stop reason: HOSPADM

## 2020-01-30 RX ORDER — FLUTICASONE PROPIONATE 50 MCG
1 SPRAY, SUSPENSION (ML) NASAL DAILY
Status: DISCONTINUED | OUTPATIENT
Start: 2020-01-30 | End: 2020-01-30 | Stop reason: HOSPADM

## 2020-01-30 RX ADMIN — BUSPIRONE HYDROCHLORIDE 10 MG: 10 TABLET ORAL at 09:38

## 2020-01-30 RX ADMIN — PANTOPRAZOLE SODIUM 40 MG: 40 TABLET, DELAYED RELEASE ORAL at 06:18

## 2020-01-30 RX ADMIN — BUSPIRONE HYDROCHLORIDE 10 MG: 10 TABLET ORAL at 15:46

## 2020-01-30 RX ADMIN — Medication 10 ML: at 09:38

## 2020-01-30 RX ADMIN — FLUTICASONE PROPIONATE 1 SPRAY: 50 SPRAY, METERED NASAL at 15:46

## 2020-01-30 RX ADMIN — AMLODIPINE BESYLATE 5 MG: 5 TABLET ORAL at 09:38

## 2020-01-30 ASSESSMENT — PAIN SCALES - GENERAL
PAINLEVEL_OUTOF10: 0

## 2020-01-30 NOTE — PROGRESS NOTES
RESPIRATORY THERAPY ASSESSMENT    Name:  Trip Salazar  Record Number:  [id number]  Age: 75 y.o. Gender: female  : 1955  Today's Date:  2020  Room:  90/9047-55    Assessment     Is the patient being admitted for a COPD or Asthma exacerbation? No   (If yes the patient will be seen every 4 hours for the first 24 hours and then reassessed)    Patient Admission Diagnosis      Allergies  Allergies   Allergen Reactions    Codeine Other (See Comments)     Seizure activity       Minimum Predicted Vital Capacity:     718          Actual Vital Capacity:      1500              Pulmonary History:No history  Home Oxygen Therapy:  room air  Home Respiratory Therapy:Albuterol   Current Respiratory Therapy:  HHN duoneb q4hr w/a  Treatment Type: IS       Respiratory Severity Index(RSI)   Patients with orders for inhalation medications, oxygen, or any therapeutic treatment modality will be placed on Respiratory Protocol. They will be assessed with the first treatment and at least every 72 hours thereafter. The following severity scale will be used to determine frequency of treatment intervention.     Smoking History: Smoking History Less than 1ppd or less than 15 pack year = 1    Social History  Social History     Tobacco Use    Smoking status: Current Every Day Smoker     Packs/day: 1.00     Years: 40.00     Pack years: 40.00     Types: Cigarettes    Smokeless tobacco: Current User   Substance Use Topics    Alcohol use: No    Drug use: No       Recent Surgical History: None = 0  Past Surgical History  Past Surgical History:   Procedure Laterality Date    BLADDER TUMOR EXCISION  2016    with Dr. Janeen Gonzalez N/A 2016     URETHRAL DILATATION, TRANSURETHERAL RESECTION OF LARGE BLADDER TUMOR    CYSTOSCOPY  03/15/2017    URETHRAL DILATION    CYSTOSCOPY  2014    [facility]    CYSTOSCOPY  2019    CYSTOSCOPY W BIOPSY OF BLADDER N/A 2019    CYSTOSCOPY, performed by Tiffani Aguillon [facility] at [facility]    intractable bleeding    OTHER SURGICAL HISTORY  11/09/2016    cystoscopy bladder biopsy       Level of Consciousness: Alert, Oriented, and Cooperative = 0    Level of Activity: Walking unassisted = 0    Respiratory Pattern: Regular Pattern; RR 8-20 = 0    Breath Sounds: Clear = 0    Sputum   ,  , Sputum How Obtained: Cough on request  Cough: Strong, spontaneous, non-productive = 0    Vital Signs   /75   Pulse 113   Temp 99.7 °F (37.6 °C) (Oral)   Resp 14   Ht 4' 11\" (1.499 m)   Wt 137 lb 5.6 oz (62.3 kg)   SpO2 96%   BMI 27.74 kg/m²   SPO2 (COPD values may differ): Greater than or equal to 92% on room air = 0    Peak Flow (asthma only): not applicable = 0    RSI: 0-4 = See once and convert to home regimen or discontinue        Plan       Goals: medication delivery    Patient/caregiver was educated on the proper method of use for Respiratory Care Devices:  Yes      Level of patient/caregiver understanding able to:   ? Verbalize understanding   ? Demonstrate understanding       ? Teach back        ? Needs reinforcement       ? No available caregiver               ? Other:     Response to education:  Good     Is patient being placed on Home Treatment Regimen? Yes     Does the patient have everything they need prior to discharge? NA     Comments: pt uses albuterol mdi prn at home, maybe once a month    Plan of Care: [facility] Duoneb q4hr prn    Electronically signed by Gisel Smith RCP on 1/30/2020 at 12:33 PM    Respiratory Protocol Guidelines     1. Assessment and treatment by Respiratory Therapy will be initiated for medication and therapeutic interventions upon initiation of aerosolized medication. 2. Physician will be contacted for respiratory rate (RR) greater than 35 breaths per minute.  Therapy will be held for heart rate (HR) greater than 140 beats per minute, pending direction from physician. 3. Bronchodilators will be administered via Metered Dose Inhaler (MDI) with spacer when the following criteria are met:  a. Alert and cooperative     b. HR < 140 bpm  c. RR < 30 bpm                d. Can demonstrate a 2-3 second inspiratory hold  4. Bronchodilators will be administered via Hand Held Nebulizer [facility] to patients when ANY of the following criteria are met  a. Incognizant or uncooperative          b. Patients treated with HHN at Home        c. Unable to demonstrate proper use of MDI with spacer     d. RR > 30 bpm   5. Bronchodilators will be delivered via Metered Dose Inhaler (MDI), HHN, Aerogen to intubated patients on mechanical ventilation. 6. Inhalation medication orders will be delivered and/or substituted as outlined below. Aerosolized Medications Ordering and Administration Guidelines:    1. All Medications will be ordered by a physician, and their frequency and/or modality will be adjusted as defined by the patients Respiratory Severity Index (RSI) score. 2. If the patient does not have documented COPD, consider discontinuing anticholinergics when RSI is less than 9.  3. If the bronchospasm worsens (increased RSI), then the bronchodilator frequency can be increased to a maximum of every 4 hours. If greater than every 4 hours is required, the physician will be contacted. 4. If the bronchospasm improves, the frequency of the bronchodilator can be decreased, based on the patient's RSI, but not less than home treatment regimen frequency. 5. Bronchodilator(s) will be discontinued if patient has a RSI less than 9 and has received no scheduled or as needed treatment for 72  Hrs. Patients Ordered on a Mucolytic Agent:    1. Must always be administered with a bronchodilator. 2. Discontinue if patient experiences worsened bronchospasm, or secretions have lessened to the point that the patient is able to clear them with a cough.     Anti-inflammatory and Combination

## 2020-01-30 NOTE — CARE COORDINATION
Orders faxed for home care through [facility]. Gladys from [facility] aware.

## 2020-01-30 NOTE — PROGRESS NOTES
central disc protrusion mildly compresses thecal sac. Mild bilateral facet osteoarthritis. Moderate right and mild left foraminal stenosis. C5-C6: Mild bilateral facet osteoarthritis. Mild endplate spurring. Mild thecal sac compression. Moderate to severe left foraminal stenosis. C6-C7: Disc bulge and endplate spurring mildly compresses thecal sac. Left uncovertebral spurring. Moderate to severe left foraminal stenosis. C7-T1: Normal.      IMPRESSION:      1. Degenerative spondylosis with dominant finding of moderate to severe left foraminal stenosis at C4-C5 and C5-C6   2. No spinal cord compression or pathologic cervical spinal cord signal   3. Equivocal 4 mm focus of intramedullary enhancement involving the spinal cord at the T2 level is incompletely characterized. Recommend MRI of thoracic spine without and with contrast with specific attention to the T2 level on axial pulse sequences. EE20  This EEG was within normal limits      Clinical correlation is recommended as a normal routine EEG does not rule out a diagnosis of epilepsy      ECHO:20   Normal left ventricle size, wall thickness, and systolic function with an   estimated ejection fraction of 55-60%. No regional wall motion abnormalities are seen. Diastolic filling parameters suggests normal diastolic function. No evidence of significant valvular stenosis or regurgitation present.       Scheduled Meds:   warfarin (COUMADIN) daily dosing (placeholder)   Other See Admin Instructions    atorvastatin  80 mg Oral Nightly    amLODIPine  5 mg Oral Daily    busPIRone  10 mg Oral TID    pantoprazole  40 mg Oral QAM AC    sodium chloride flush  10 mL Intravenous 2 times per day    nicotine  1 patch Transdermal Daily        ASSESSMENT & RECOMMENDATIONS:   Guevara Schaffer 74 y/o female who presented after an episode of loss of consciousness with recurring episodes of  transient numbness and tingling of bilateral upper extremities with with presyncope, neck pain and visual disturbances etiology unclear with known hx of meningioma and CT angio positive for high-grade left ICA stenosis. She does have a potential seizure focus from her meningioma but EEG is negative. MRI of the brain with no acute process. Some of her symptoms may be related to migraine phenomena.        1. Transient alteration in awareness. 2.  Meningioma  3. High-grade left ICA stenosis  4. Hx of cerebral infarction  5. HTN  6. Tobacco use      ==============  RECOMMENDATIONS:  1. Vascular consultant recommendations reviewed  2. Continue statin therapy. 3.  Continue anticoagulation. 4.  Patient instructed and advised on smoking cessation. 5.  Check orthostatic vital signs  6. Recommend outpatient cardiac event monitor will defer to primary medical team.  7.  If patient continues to have recurrent syncopal episodes that are not cardiac in nature she may require ambulatory EEG or EMU admission to further assess for seizures. 8.  No further neurological work-up or recommendations. Please call with any questions or concerns.       A copy of this note was provided for MD Marianne Arellano APRN-CNP  [address] [place] [address] [address] Neuroscience  [phone]  Evenings, weekends, and off weeks please discuss with the covering neurologist.

## 2020-01-30 NOTE — CARE COORDINATION
Case Management Assessment           Initial Evaluation                Date / Time of Evaluation: 1/30/2020 2:53 PM                 Assessment Completed by: Darling Guadarrama    Patient Name: Bakari Calderon     YOB: 1955  Diagnosis: Syncope, unspecified syncope type [R55]  Syncope, unspecified syncope type [R55]     Date / Time: 1/28/2020 10:34 PM    Patient Admission Status: Inpatient    If patient is discharged prior to next notation, then this note serves as note for discharge by case management. Current PCP: Cesilia Zaragoza PA-C  Clinic Patient: No    Chart Reviewed: Yes  Patient/ Family Interviewed: Yes    Initial assessment completed at bedside with: patient and significant other    Hospitalization in the last 30 days: No    Emergency Contacts:  Extended Emergency Contact Information  Primary Emergency Contact: Ashley Arana  Address: [place] [address] Phone: [phone]  Relation: Other  Secondary Emergency Contact: [address] [place] [address] [address] Phone: [phone]  Relation: Brother/Sister    Advance Directives:   Code Status: Full 2021 Leobardo Thorntony: No  Agent:   Contact Number:     Copy present: No     In paper Chart: No    Scanned into EMR No    Financial  Payor: MEDICARE / Plan: MEDICARE PART A AND B / Product Type: *No Product type* /     Pre-cert required for SNF: No    Pharmacy    [address] [facility], [address] [phone] Frankie Duncan [phone]  [address]  Phone: [phone] Fax: [phone]      Potential assistance Purchasing Medications: Potential Assistance Purchasing Medications: No  Does Patient want to participate in local refill/ meds to beds program?: No    Meds To Beds General Rules:  1. Can ONLY be done Monday- Friday between 8:30am-5pm  2. Prescription(s) must be in pharmacy by 3pm to be filled same day  3. Copy of patient's insurance/ prescription drug card and patient face sheet must be sent along with the prescription(s)  4. Cost of Rx cannot be added to hospital bill. If financial assistance is needed, please contact unit  or ;  or  CANNOT provide pharmacy voucher for patients co-pays  5. Patients can then  the prescription on their way out of the hospital at discharge, or pharmacy can deliver to the bedside if staff is available. (payment due at time of pick-up or delivery - cash, check, or card accepted)     Able to afford home medications/ co-pay costs: Yes    ADLS  Support Systems: Spouse/Significant Other    PT AM-PAC:   /24  OT AM-PAC:   /24    [place]: home w/spouse  Steps: 2    Plans to RETURN to current housing: Yes  Barriers to RETURNING to current housing: none    Braeden Gary 76  Currently ACTIVE with [facility] [address]: No  Home Care Agency: Not Applicable    Currently ACTIVE with [facility]: No  Passport/ Waiver: No  Passport/ Waiver Services: Not Applicable    :  Phone:       [id number]  [facility] Provider:   Equipment: cane and walker    Home Oxygen and [address] [place] prior to admission: No  Charan [address]: Not Applicable  Other Respiratory Equipment:     Informed of need to bring portable home O2 tank on day of DISCHARGE for nursing to connect prior to leaving: No  Verbalized agreement/Understanding: No  Person to bring portable tank at discharge:     Dialysis  Active with HD/PD prior to admission: No  Nephrologist:     HD Center:  Not Applicable    DISCHARGE PLAN:  Disposition: Home- No Services Needed    Transportation PLAN for discharge: family     Factors facilitating achievement of predicted outcomes: Family support, Motivated and Cooperative    Barriers to discharge: none    Additional Case Management Notes: CM met with patient and spouse. Pt lives at home w/significant other. 2 steps to get in. Pt is independent at baseline.  May require home care at [facility] for PT. Will await PT /OT rec's. Pt has DME's at home. Family will drive the patient home. Pt lives in [place]. May need home care company close to [place]. The Plan for Transition of Care is related to the following treatment goals of Syncope, unspecified syncope type [R55]  Syncope, unspecified syncope type [R55]    The Patient and/or patient representative Alley and her family were provided with a choice of provider and agrees with the discharge plan Yes    Freedom of choice list was provided with basic dialogue that supports the patient's individualized plan of care/goals and shares the quality data associated with the providers.  Yes      Care Transition patient: No    Rebel Allen RN  [facility] [employer]  Case Management Department  Ph: [phone]   Fax: [phone]

## 2020-01-30 NOTE — PROGRESS NOTES
Score  AM-PAC Inpatient Daily Activity Raw Score: 23 (01/30/20 1529)  AM-PAC Inpatient ADL T-Scale Score : 51.12 (01/30/20 1529)  ADL Inpatient CMS 0-100% Score: 15.86 (01/30/20 1529)  ADL Inpatient CMS G-Code Modifier : CI (01/30/20 1529)    Therapy Time   Individual Concurrent Group Co-treatment   Time In 1450         Time Out 1515         Minutes 25         Timed Code Treatment Minutes:   10  Total Treatment Minutes:  Luann Phan 31 OTR/L, 3448

## 2020-01-30 NOTE — CARE COORDINATION
[facility]    Referral received from  to follow for home care services.        Joseph Perez  Work mobile: [phone]  [facility] office: [phone]

## 2020-01-30 NOTE — PROGRESS NOTES
Physical Therapy    Facility/Department: [place] [id number] 4 PCU  Initial Assessment    NAME: Davidson Jin  : 1955  MRN: [id number]    Date of Service: 2020    Discharge Recommendations:Alley Parra scored a 20/24 on the AM-PAC short mobility form. Current research shows that an AM-PAC score of 18 or greater is typically associated with a discharge to the patient's home setting. Based on the patients AM-PAC score and their current functional mobility deficits, it is recommended that the patient have 2-3 sessions per week of Physical Therapy at d/c to increase the patients independence. PT Equipment Recommendations  Equipment Needed: No    Assessment   Assessment: 76 yo admitted 20 for syncope/LOC. Pt reports she plans to return home with 24 hour supervision per friend as before. Pt would benefit from 24 hour supervision initially, home PT, use of rolling walker (has). Treatment Diagnosis: mobility impairment due to syncope  Decision Making: Low Complexity  Patient Education: Pt educated on PT role, need to call for assist to get up, recommend use of rolling walker initially at home and she verbalized understanding. REQUIRES PT FOLLOW UP: Yes  Activity Tolerance  Activity Tolerance: Patient Tolerated treatment well       Patient Diagnosis(es): There were no encounter diagnoses. has a past medical history of Bladder cancer ([address] Brain aneurysm, CVA (cerebral vascular accident) ([address] Disc disease, degenerative, cervical, Hypertension, Meningioma ([address] and Migraine. has a past surgical history that includes Cystoscopy (N/A, 2016); Colonoscopy; bladder tumor excision (2016); Hysterectomy, vaginal (); other surgical history (2016); Cystocopy (03/15/2017); Cystoscopy (2014); Cystoscopy (2019); and cystoscopy w biopsy of bladder (N/A, 2019).     Restrictions  Position Activity Restriction  Other position/activity restrictions: up as tolerated  Vision/Hearing: [facility] [address]        Subjective  General  Chart Reviewed: Yes  Additional Pertinent Hx: 76 yo admitted 1/29/20 for syncope/LOC. CT angio positive L ICA stenosis, Brain MRI negative, MRI C spine  negative, EEG WNL; cardio and neuro consults; vascular consult-no surgery. Pmhx; HTN, CVA, bladder CA. Family / Caregiver Present: Yes(family)  Diagnosis: syncope/LOC  Follows Commands: Within Functional Limits  Subjective  Subjective: Pt found seated EOB and agreeable to PT. \" I'm ready to get out of here.  \"  Pain Screening  Patient Currently in Pain: Denies         Orientation  Orientation  Overall Orientation Status: Within Normal Limits     Social/Functional History  Social/Functional History  Lives With: Daughter  Type of Home: Mobile home  Home Layout: One level  Home Access: Stairs to enter with rails(3)  Bathroom Shower/Tub: Tub/Shower unit  Bathroom Toilet: Standard(sink nearby)  Bathroom Equipment: Grab bars in shower  Home Equipment: Rolling walker, Cane(not using [facility]-has shower chair, BS)  ADL Assistance: Independent  Homemaking Assistance: (family performs)  Ambulation Assistance: Independent  Transfer Assistance: Independent  Active : Yes  Occupation: Retired  Leisure & Hobbies: 3 dogs       Objective          AROM RLE (degrees)  RLE AROM: WFL  AROM LLE (degrees)  LLE AROM : WFL     Strength RLE  Strength RLE: WFL  Strength LLE  Strength LLE: WFL        Bed mobility  Rolling to Left: Independent  Rolling to Right: Independent  Supine to Sit: Independent  Sit to Supine: Independent     Transfers  Sit to Stand: Stand by assistance(x2 trials)  Stand to sit: Stand by assistance(x2 trials)     Ambulation  Ambulation?: Yes  More Ambulation?: Yes  Ambulation 1  Device: No Device  Assistance: Contact guard assistance  Quality of Gait: slow cat with occ stagger step which pt able to recover on her own; narrow BEATRICE with decreased step length  Distance: 100 ft  Ambulation 2  Device 2:

## 2020-01-30 NOTE — PLAN OF CARE
Problem: Falls - Risk of:  Goal: Will remain free from falls  Outcome: Ongoing  Note:   Patient remains free of falls and accidental injury. Patient assessed as a medium fall risk. Bed in lowest position, non-skid footwear in place, call light in reach, instructed patients to call for needs or assistance out of bed. Will continue to monitor.        Problem: Self-Care Deficit:  Goal: Ability to perform activities of daily living will improve  Outcome: Met This Shift

## 2020-01-30 NOTE — DISCHARGE SUMMARY
Hospital Discharge Summary    Patient's PCP: Eduardo Stokes PA-C  Admit Date: 1/28/2020   Discharge Date: 1/30/2020    Admitting Physician: Dr. Bel Escoto MD  Discharge Physician: Dr. Nadia Stinson:   [facility] TO CARDIOLOGY    Brief HPI: Rochelle Vences is a 75 y.o. female who transfer from [facility] ER, patient mentioned that today in the morning was feeling cold he she heated her truck and felt warm and after that she passed out her partner was with her and 9 1 was called she does mention that she was little confused after this episode. She does mention that having these kind of symptoms. Mention that pending does worsens her symptoms.     She does have a history of stroke in 2014, she also had a history of meningioma. Investigation in the ER does reveal that she has chronic occlusion in the left common carotid and 90% in the left internal carotid. She does continue to smoke. She does has slight worsening in the size of meningioma.     Old medical records show   Occluded left common carotid artery, 2014 [facility] at [facility]    Brief hospital course:     1. Syncope:-Etiology unclear at present, seen by by neurology and vascular surgery she has significant left internal carotid stenosis, echo is unremarkable, MRI brain with stable left frontal meningioma without brain edema, chronic infarctions involving the right cerebellum, no acute infarctions. Per Neurology notes  -Continue statin therapy.  - Continue anticoagulation.  -  Patient instructed and advised on smoking cessation.  -  outpatient cardiac event monitor set up prior to discharge.    -  If patient continues to have recurrent syncopal episodes that are not cardiac in nature she may require ambulatory EEG or EMU admission to further assess for seizures.    -Per vascular surgery left Internal carotid artery appears chronically occluded, 1. 5 cm. Stable mild mass effect at the adjacent frontal lobe cortex. No associated brain edema. INTRACRANIAL VASCULATURE: There is abnormal T2 hyperintensity involving the left internal carotid artery compatible with occlusion or severe stenosis. Severe stenosis was demonstrated on CTA 10/29/2014. Dominant left lateral dural venous sinus, jugular bulb and internal jugular vein are present. ORBITS: Normal. BONE MARROW: Bone marrow signal within normal limits. PARANASAL SINUSES/MASTOID BONES: No acute sinusitis or mastoiditis. EXTRACRANIAL SOFT TISSUES: Normal     1. No acute infarction or intracranial hemorrhage 2. Stable left frontal meningioma 1.6 x 1.7 x 1.5 cm without brain edema 3. Chronic infarctions involving the right cerebellum MRI cervical spine FINDINGS: SPINAL CORD: No evidence for pathologic signal involving the cervical spinal cord. No definite pathologic precontrast signal seen involving the visualized upper thoracic spinal cord on the sagittal pulse sequences. However, there is equivocal pathologic intramedullary enhancement measuring 4 mm at the T2 level. This level spinal cord is incompletely characterized. CEREBELLAR TONSILS: Normal. ALIGNMENT: The craniovertebral alignment is normal. There is subtle degenerative grade I anterolisthesis of C4-C5 through C6-C7. BONES: Vertebral body heights are normal. Bone marrow signal is normal. NECK SOFT TISSUES: Normal. C2-C3: Small central disc protrusion mildly compresses thecal sac. Mild left facet osteoarthritis. C3-C4: Mild left facet osteoarthritis. C4-C5: Left central disc protrusion mildly compresses thecal sac. Mild bilateral facet osteoarthritis. Moderate right and mild left foraminal stenosis. C5-C6: Mild bilateral facet osteoarthritis. Mild endplate spurring. Mild thecal sac compression. Moderate to severe left foraminal stenosis. C6-C7: Disc bulge and endplate spurring mildly compresses thecal sac. Left uncovertebral spurring.  Moderate to severe Mark Betancourt MD  Procedure Type of Study:   Cerebral:Carotid, VL CAROTID DUPLEX BILATERAL. Vascular Sonographer Report  Indications for Study:Syncope and Carotid disease. Additional Indications:One episode of loss of consciousness yesterday. Patient was found down on the ground, outside of a truck (but had not been there for long). She also states having a previous history of left hemispheric CVA in 2014 or 2015. Carotid Artery Duplex Scan: Transverse and longitudinal grayscale and color imaging of the extracranial carotid system including Doppler spectral waveform analysis. Impressions Right Impression The right internal carotid artery reveals a <50% diameter reducing stenosis. The right vertebral artery demonstrates normal antegrade flow. The right subclavian artery is visualized and demonstrates multiphasic flow. Left Impression The left internal carotid artery appears to be occluded. There is bi-directional flow noted in the external carotid artery. There appears to be a high grade stenosis noted in the proximal common carotid artery (very difficult to visualize) however, this cannot be confirmed by velocity data (since the CCA flow is so dampened, possibly by the ICA occlusion). The left vertebral artery demonstrates normal antegrade flow. The left subclavian artery is visualized and demonstrates multiphasic flow. As compared to the CTA results from yesterday, the only significant difference noted is that the CTA report states that the left ICA may be open, whereas in the duplex scan today the left ICA appears to be occluded. Conclusions   Summary   <50% stenosis noted in the right internal carotid artery. Left internal carotid artery appears to be occluded. There is a high grade stenosis noted in the left proximal common carotid  artery, however, this is difficult to visualize with ultrasound due to  anatomy.   The left external carotid artery has abnormal, There is antegrade vertebral flow noted on the right side. - Additional Measurements:ICAPSV/CCAPSV 1.19. ICAEDV/CCAEDV 1.91. Carotid Left Measurements +---------------+----+----+-----+----+ ! Location       ! PSV ! EDV ! Angle! RI  ! +---------------+----+----+-----+----+ ! Prox CCA       !172 !0   !40   !1   ! +---------------+----+----+-----+----+ ! Mid CCA        !21.1!    !60   !    ! +---------------+----+----+-----+----+ ! Dist CCA       !8.34!    !60   !    ! +---------------+----+----+-----+----+ ! Prox ICA       ! 14  !7.47!54   !0.47! +---------------+----+----+-----+----+ ! Mid ICA        !0   !    !     !    ! +---------------+----+----+-----+----+ ! Dist ICA       !0   !    !     !    ! +---------------+----+----+-----+----+ ! Prox ECA       !63.7!    !60   !    ! +---------------+----+----+-----+----+ ! Vertebral      !59. 3!17. 3! 50   !0.71! +---------------+----+----+-----+----+ ! Prox Subclavian! 129 !0   !44   !1   ! +---------------+----+----+-----+----+   - There is antegrade vertebral flow noted on the left side. - Additional Measurements:ICAPSV/CCAPSV 0.66. Cta Head Neck W Contrast    Result Date: 1/28/2020  EXAMINATION: CTA OF THE HEAD AND NECK WITH CONTRAST 1/28/2020 4:48 pm: TECHNIQUE: CTA of the head and neck was performed with the administration of intravenous contrast. Multiplanar reformatted images are provided for review. MIP images are provided for review. Stenosis of the internal carotid arteries measured using NASCET criteria. Dose modulation, iterative reconstruction, and/or weight based adjustment of the mA/kV was utilized to reduce the radiation dose to as low as reasonably achievable. COMPARISON: CT head earlier today.  HISTORY: ORDERING SYSTEM PROVIDED HISTORY: altered TECHNOLOGIST PROVIDED HISTORY: Reason for exam:->altered Reason for Exam: unresponsive today, hx of stroke, on coumadin, hx of Meningioma Acuity: Acute Type of Exam: Initial FINDINGS: CTA NECK: AORTIC ARCH/ARCH VESSELS: Mild atherosclerotic plaque involve the aortic arch without aneurysm or dissection. Conventional 3 vessel arch anatomy. Mild atherosclerotic plaque involving the innominate and right subclavian artery without significant stenosis. Moderate stenosis of the proximal left subclavian artery secondary to irregular noncalcified plaque. No acute dissection. CAROTID ARTERIES: Right: Mild atherosclerotic stenosis of the mid to distal right common carotid artery. Mild fibrocalcific plaque at the right carotid bifurcation causing less than 25% stenosis of the proximal right internal carotid artery. Cervical right internal carotid otherwise normal in caliber. Right external carotid artery patent. No dissection. Left: Multifocal severe, nearly occlusive stenosis of the proximal left common carotid artery. Complete occlusion of the left common carotid artery terminus. Left external carotid artery branches fill via collateral supply from the left vertebral artery. Near complete occlusion of the cervical left internal carotid artery with only a thin string of intraluminal contrast.  No acute dissection. VERTEBRAL ARTERIES: Bilateral vertebral artery origin stenosis, moderate on the right and mild on the left. Vertebral arteries otherwise codominant and normal in caliber without stenosis. Multiple collateral arteries arising from the distal cervical left vertebral artery supply the left external carotid artery branches. SOFT TISSUES: Moderate emphysema at the lung apices. Soft tissues of the neck are unremarkable. BONES: No acute osseous abnormality or suspect osseous lesion. Mild degenerative changes in the cervical spine. CTA HEAD: ANTERIOR CIRCULATION: Severe diffuse stenosis of the petrous through supraclinoid left internal carotid artery with only a thin string of intraluminal contrast.  Left ophthalmic artery appears patent. Right internal carotid artery normal in caliber.   Anterior and middle cerebral arteries normal in caliber. Patent anterior communicating artery. No aneurysm. POSTERIOR CIRCULATION: No significant stenosis of the vertebral, basilar, or posterior cerebral arteries. No aneurysm. Left posterior cerebral artery has fetal origin. Diminutive right posterior communicating artery is noted. OTHER: No dural venous sinus thrombosis on this non-dedicated study. BRAIN: Calcified 1.9 cm left frontoparietal meningioma. No mass effect or midline shift. No extra-axial fluid collection. The gray-white differentiation is maintained. 1. No large vessel occlusion of the intracranial arteries. 2. Chronic appearing occlusion of the left common carotid artery terminus and severe diffuse stenosis of the left internal carotid artery with only a thin string of contrast indicating greater than 90% stenosis. 3. No significant right internal carotid artery stenosis. 4. Bilateral vertebral artery stenosis, moderate on the right and mild on the left. 5. Moderate stenosis of the proximal left subclavian artery. Mri Brain W Wo Contrast    Result Date: 1/29/2020  EXAM: MRI BRAIN W WO CONTRAST, MRI CERVICAL SPINE W WO CONTRAST INDICATION: Syncope; bilateral arm numbness with neck pain COMPARISON: CT head 1/28/2020; MRI head 3/18/2014 TECHNIQUE: Multiplanar, multisequence MR imaging of the head and cervical spine obtained without and with contrast. MRI head FINDINGS: MIDLINE STRUCTURES: The sella turcica and pituitary gland are normal. Craniovertebral alignment and cerebellar tonsils are normal. VENTRICLES: Normal size and configuration for patient age. INTRACRANIAL HEMORRHAGE: None. BRAIN PARENCHYMA: Evolution of chronic infarctions involving right cerebellum since the prior MRI. There is no diffusion restriction. There is a stable chronic infarction involving the parasagittal posterior right parietal lobe.   Nonenhancing punctate foci of FLAIR hyperintensity involving parietal subcortical white matter without diffusion restriction suggestive of chronic small vessel ischemia. Left frontal extra-axial isointense, enhancing mass is stable measuring 1.6 x 1.7 x 1.5 cm. Stable mild mass effect at the adjacent frontal lobe cortex. No associated brain edema. INTRACRANIAL VASCULATURE: There is abnormal T2 hyperintensity involving the left internal carotid artery compatible with occlusion or severe stenosis. Severe stenosis was demonstrated on CTA 10/29/2014. Dominant left lateral dural venous sinus, jugular bulb and internal jugular vein are present. ORBITS: Normal. BONE MARROW: Bone marrow signal within normal limits. PARANASAL SINUSES/MASTOID BONES: No acute sinusitis or mastoiditis. EXTRACRANIAL SOFT TISSUES: Normal     1. No acute infarction or intracranial hemorrhage 2. Stable left frontal meningioma 1.6 x 1.7 x 1.5 cm without brain edema 3. Chronic infarctions involving the right cerebellum MRI cervical spine FINDINGS: SPINAL CORD: No evidence for pathologic signal involving the cervical spinal cord. No definite pathologic precontrast signal seen involving the visualized upper thoracic spinal cord on the sagittal pulse sequences. However, there is equivocal pathologic intramedullary enhancement measuring 4 mm at the T2 level. This level spinal cord is incompletely characterized. CEREBELLAR TONSILS: Normal. ALIGNMENT: The craniovertebral alignment is normal. There is subtle degenerative grade I anterolisthesis of C4-C5 through C6-C7. BONES: Vertebral body heights are normal. Bone marrow signal is normal. NECK SOFT TISSUES: Normal. C2-C3: Small central disc protrusion mildly compresses thecal sac. Mild left facet osteoarthritis. C3-C4: Mild left facet osteoarthritis. C4-C5: Left central disc protrusion mildly compresses thecal sac. Mild bilateral facet osteoarthritis. Moderate right and mild left foraminal stenosis. C5-C6: Mild bilateral facet osteoarthritis. Mild endplate spurring. Mild thecal sac compression.  Moderate to severe left foraminal stenosis. C6-C7: Disc bulge and endplate spurring mildly compresses thecal sac. Left uncovertebral spurring. Moderate to severe left foraminal stenosis. C7-T1: Normal. IMPRESSION: 1. Degenerative spondylosis with dominant finding of moderate to severe left foraminal stenosis at C4-C5 and C5-C6 2. No spinal cord compression or pathologic cervical spinal cord signal 3. Equivocal 4 mm focus of intramedullary enhancement involving the spinal cord at the T2 level is incompletely characterized. Recommend MRI of thoracic spine without and with contrast with specific attention to the T2 level on axial pulse sequences. Treatments: As above. Discharge Medications:     Medication List      START taking these medications    doxycycline hyclate 100 MG tablet  Commonly known as:  VIBRA-TABS  Take 1 tablet by mouth 2 times daily for 3 days     fluticasone 50 MCG/ACT nasal spray  Commonly known as:  FLONASE  1 spray by Each Nostril route daily     nicotine 14 MG/24HR  Commonly known as:  NICODERM CQ  Place 1 patch onto the skin daily  Start taking on:  January 31, 2020        CONTINUE taking these medications    acetaminophen 500 MG tablet  Commonly known as:  TYLENOL     albuterol sulfate  (90 Base) MCG/ACT inhaler  Commonly known as:  Proventil HFA  Inhale 2 puffs into the lungs every 6 hours as needed for Shortness of Breath. IF YOU FEEL AS THO YOU ARE STILL NEEDING THIS MEDICATION AFTER 10 DAYS, CALL YOUR DR TO DISCUSS FURTHER TREATMENT     amitriptyline 10 MG tablet  Commonly known as:  ELAVIL     amLODIPine 5 MG tablet  Commonly known as:  NORVASC  Take 1 tablet by mouth daily.      busPIRone 10 MG tablet  Commonly known as:  BUSPAR     omeprazole 20 MG delayed release capsule  Commonly known as:  PRILOSEC     simvastatin 20 MG tablet  Commonly known as:  ZOCOR     * warfarin 5 MG tablet  Commonly known as:  COUMADIN     * warfarin 10 MG tablet  Commonly known as:  COUMADIN         * This list

## 2020-01-30 NOTE — PROGRESS NOTES
Clinical Pharmacy Progress Note    Admit date: 1/28/2020     Interval update: Vascular surgery consulted - no surgical intervention planned. Subjective/Objective:  75 yoF with PMH significant for HTN, CVA/TIA x 4 (on warfarin), meningioma, and migraines admitted for work up following syncopal event on 1/28; found to have L carotid artery stenosis. Pharmacy is consulted to dose warfarin per Dr. Betsy Scott anticoagulation regimen:  Warfarin 5 mg Sun/Mon/Fri + Warfarin 10 mg all other days   Managed by Francine Davenport CNP with [facility] ([place])  Per patient's partner, INR 2 weeks ago was elevated to >6, and last week was <2.     Labs:  Date INR Warfarin Dose   1/28 2.55 5 mg (prior to admission)   1/29 3.23 Held   1/30 2.22             Recent Labs     01/28/20  1634 01/29/20  0440    141   K 3.4* 3.9    106   CO2 21 22   BUN 16 11   CREATININE 0.9 0.6   GLUCOSE 120* 107*       CrCl cannot be calculated (Unknown ideal weight.). Lab Results   Component Value Date    WBC 15.7 (H) 01/28/2020    HGB 14.4 01/28/2020    HCT 42.8 01/28/2020    MCV 89.7 01/28/2020     01/28/2020       Lab Results   Component Value Date    PROTIME 26.0 (H) 01/30/2020    INR 2.22 (H) 01/30/2020       Height:  4' 11\" (149.9 cm)  Weight:  137 lb 5.6 oz (62.3 kg)    Assessment/Plan:  1) Anticoagulation - hx of CVA: pharmacy to dose warfarin  · Goal INR 2-3  · Warfarin held yesterday as INR was supratherapeutic (3.23) and trending up. Unsure why INR was elevated yesterday. · INR now therapeutic (2.22) -- will resume warfarin at home dose of 10 mg daily except for 5 mg on Sun + Mon + Fri. Due for warfarin 10 mg tonight. · Medication profile reviewed for potential drug interactions. No significant drug interactions with warfarin noted. Patient does smoke 2 PPD at home - cessation while in hospital may decrease warfarin requirement. · Daily INR will be monitored and dose adjustments made as needed. Please call with any questions.   Ankita Dhillon, PharmD, BCPS  Wireless: [phone]  or [phone]  1/30/2020 10:47 AM

## 2020-03-03 PROCEDURE — 93228 REMOTE 30 DAY ECG REV/REPORT: CPT | Performed by: INTERNAL MEDICINE

## 2020-03-16 ENCOUNTER — TELEPHONE (OUTPATIENT)
Dept: CARDIOLOGY CLINIC | Age: 65
End: 2020-03-16

## 2020-03-16 NOTE — TELEPHONE ENCOUNTER
The final report for the event   monitor has been read and scanned under the media tab. Thank you for your referral. Please feel free to contact our office with any questions at  [phone].

## 2022-08-04 ENCOUNTER — HOSPITAL ENCOUNTER (OUTPATIENT)
Dept: NON INVASIVE DIAGNOSTICS | Age: 67
Discharge: HOME | End: 2022-08-04
Payer: MEDICARE

## 2022-08-04 DIAGNOSIS — R01.1 HEART MURMUR: ICD-10-CM

## 2022-08-04 LAB
LV EF: 63 %
LVEF MODALITY: NORMAL

## 2022-08-04 PROCEDURE — 93306 TTE W/DOPPLER COMPLETE: CPT

## 2023-06-20 ENCOUNTER — APPOINTMENT (OUTPATIENT)
Dept: CT IMAGING | Age: 68
End: 2023-06-20
Payer: MEDICARE

## 2023-06-20 ENCOUNTER — HOSPITAL ENCOUNTER (EMERGENCY)
Age: 68
Discharge: HOME | End: 2023-06-20
Attending: EMERGENCY MEDICINE
Payer: MEDICARE

## 2023-06-20 VITALS
WEIGHT: 140 LBS | HEIGHT: 59 IN | DIASTOLIC BLOOD PRESSURE: 69 MMHG | TEMPERATURE: 98.1 F | BODY MASS INDEX: 28.22 KG/M2 | SYSTOLIC BLOOD PRESSURE: 129 MMHG | HEART RATE: 85 BPM | OXYGEN SATURATION: 97 % | RESPIRATION RATE: 16 BRPM

## 2023-06-20 DIAGNOSIS — S09.90XA CLOSED HEAD INJURY, INITIAL ENCOUNTER: Primary | ICD-10-CM

## 2023-06-20 DIAGNOSIS — Z79.01 ANTICOAGULATED ON COUMADIN: ICD-10-CM

## 2023-06-20 LAB
ALBUMIN SERPL-MCNC: 3.8 G/DL (ref 3.4–5)
ALBUMIN/GLOB SERPL: 1.2 {RATIO} (ref 1.1–2.2)
ALP SERPL-CCNC: 133 U/L (ref 40–129)
ALT SERPL-CCNC: 10 U/L (ref 10–40)
ANION GAP SERPL CALCULATED.3IONS-SCNC: 14 MMOL/L (ref 3–16)
AST SERPL-CCNC: 13 U/L (ref 15–37)
BASOPHILS # BLD: 0.1 K/UL (ref 0–0.2)
BASOPHILS NFR BLD: 0.7 %
BILIRUB SERPL-MCNC: <0.2 MG/DL (ref 0–1)
BUN SERPL-MCNC: 9 MG/DL (ref 7–20)
CALCIUM SERPL-MCNC: 9 MG/DL (ref 8.3–10.6)
CHLORIDE SERPL-SCNC: 106 MMOL/L (ref 99–110)
CO2 SERPL-SCNC: 23 MMOL/L (ref 21–32)
CREAT SERPL-MCNC: 0.6 MG/DL (ref 0.6–1.2)
DEPRECATED RDW RBC AUTO: 14.5 % (ref 12.4–15.4)
EKG ATRIAL RATE: 88 BPM
EKG DIAGNOSIS: NORMAL
EKG P AXIS: 46 DEGREES
EKG P-R INTERVAL: 154 MS
EKG Q-T INTERVAL: 410 MS
EKG QRS DURATION: 74 MS
EKG QTC CALCULATION (BAZETT): 496 MS
EKG R AXIS: 31 DEGREES
EKG T AXIS: 23 DEGREES
EKG VENTRICULAR RATE: 88 BPM
EOSINOPHIL # BLD: 0.1 K/UL (ref 0–0.6)
EOSINOPHIL NFR BLD: 0.9 %
ETHANOLAMINE SERPL-MCNC: NORMAL MG/DL (ref 0–0.08)
GFR SERPLBLD CREATININE-BSD FMLA CKD-EPI: >60 ML/MIN/{1.73_M2}
GLUCOSE SERPL-MCNC: 122 MG/DL (ref 70–99)
HCT VFR BLD AUTO: 41.5 % (ref 36–48)
HGB BLD-MCNC: 13.7 G/DL (ref 12–16)
INR PPP: 2.01 (ref 0.84–1.16)
LYMPHOCYTES # BLD: 5.1 K/UL (ref 1–5.1)
LYMPHOCYTES NFR BLD: 38.6 %
MAGNESIUM SERPL-MCNC: 1.9 MG/DL (ref 1.8–2.4)
MCH RBC QN AUTO: 28.6 PG (ref 26–34)
MCHC RBC AUTO-ENTMCNC: 33 G/DL (ref 31–36)
MCV RBC AUTO: 86.5 FL (ref 80–100)
MONOCYTES # BLD: 1 K/UL (ref 0–1.3)
MONOCYTES NFR BLD: 7.6 %
NEUTROPHILS # BLD: 6.9 K/UL (ref 1.7–7.7)
NEUTROPHILS NFR BLD: 52.2 %
PLATELET # BLD AUTO: 372 K/UL (ref 135–450)
PMV BLD AUTO: 8.3 FL (ref 5–10.5)
POTASSIUM SERPL-SCNC: 3.6 MMOL/L (ref 3.5–5.1)
PROT SERPL-MCNC: 6.9 G/DL (ref 6.4–8.2)
PROTHROMBIN TIME: 22.6 SEC (ref 11.5–14.8)
RBC # BLD AUTO: 4.79 M/UL (ref 4–5.2)
SODIUM SERPL-SCNC: 143 MMOL/L (ref 136–145)
TROPONIN, HIGH SENSITIVITY: <6 NG/L (ref 0–14)
WBC # BLD AUTO: 13.2 K/UL (ref 4–11)

## 2023-06-20 PROCEDURE — 93010 ELECTROCARDIOGRAM REPORT: CPT | Performed by: INTERNAL MEDICINE

## 2023-06-20 PROCEDURE — 36415 COLL VENOUS BLD VENIPUNCTURE: CPT

## 2023-06-20 PROCEDURE — 93005 ELECTROCARDIOGRAM TRACING: CPT | Performed by: EMERGENCY MEDICINE

## 2023-06-20 PROCEDURE — 84484 ASSAY OF TROPONIN QUANT: CPT

## 2023-06-20 PROCEDURE — 70450 CT HEAD/BRAIN W/O DYE: CPT

## 2023-06-20 PROCEDURE — 85610 PROTHROMBIN TIME: CPT

## 2023-06-20 PROCEDURE — 99284 EMERGENCY DEPT VISIT MOD MDM: CPT

## 2023-06-20 PROCEDURE — 72125 CT NECK SPINE W/O DYE: CPT

## 2023-06-20 PROCEDURE — 85025 COMPLETE CBC W/AUTO DIFF WBC: CPT

## 2023-06-20 PROCEDURE — 83735 ASSAY OF MAGNESIUM: CPT

## 2023-06-20 PROCEDURE — 80053 COMPREHEN METABOLIC PANEL: CPT

## 2023-06-20 PROCEDURE — 82077 ASSAY SPEC XCP UR&BREATH IA: CPT

## 2023-06-20 ASSESSMENT — PAIN DESCRIPTION - ONSET: ONSET: GRADUAL

## 2023-06-20 ASSESSMENT — PAIN - FUNCTIONAL ASSESSMENT: PAIN_FUNCTIONAL_ASSESSMENT: NONE - DENIES PAIN

## 2023-06-20 ASSESSMENT — PAIN DESCRIPTION - LOCATION: LOCATION: HEAD;SHOULDER

## 2023-06-20 ASSESSMENT — PAIN DESCRIPTION - FREQUENCY: FREQUENCY: CONTINUOUS

## 2023-06-20 ASSESSMENT — PAIN DESCRIPTION - PAIN TYPE: TYPE: ACUTE PAIN

## 2023-06-20 ASSESSMENT — PAIN DESCRIPTION - DESCRIPTORS: DESCRIPTORS: ACHING

## 2023-06-20 ASSESSMENT — PAIN DESCRIPTION - ORIENTATION: ORIENTATION: LEFT

## 2023-06-20 ASSESSMENT — PAIN SCALES - GENERAL: PAINLEVEL_OUTOF10: 7

## 2023-06-20 NOTE — ED PROVIDER NOTES
[facility]      Pt Name: Summer Vivas  MRN: [id number]  [place] 1955  Date of evaluation: 6/20/2023  Provider: Diana Padilla MD    [address] [place] [address]       Chief Complaint   Patient presents with    Fall     Pt brought in by EMS from home for a fall. Pt fell backwards hitting head a left shoulder on a shed. Pt had mental status change 3 minutes prior to arrival, pt alert but has weakness to left arm. Pt is on coumadin          HISTORY OF PRESENT ILLNESS   (Location/Symptom, Timing/Onset, Context/Setting, Quality, Duration, Modifying Factors, Severity)  Note limiting factors. Summer Vivas is a 77 y.o. female who presents to the emergency department     The history is provided by the patient and the EMS personnel. Nursing Notes were reviewed. REVIEW OF SYSTEMS    (2-9 systems for level 4, 10 or more for level 5)     Review of Systems    Except as noted above the remainder of the review of systems was reviewed and negative. PAST MEDICAL HISTORY     Past Medical History:   Diagnosis Date    Bladder cancer ([address] 08/17/2016    Brain aneurysm     brain tumor per pt (2013)    CVA (cerebral vascular accident) ([address]     3 EPISODES.     Disc disease, degenerative, cervical     Hypertension     Meningioma ([address]     Migraine          SURGICAL HISTORY       Past Surgical History:   Procedure Laterality Date    BLADDER TUMOR EXCISION  08/17/2016    with Dr. Roland Lazar N/A 08/17/2016     URETHRAL DILATATION, TRANSURETHERAL RESECTION OF LARGE BLADDER TUMOR    CYSTOSCOPY  03/15/2017    URETHRAL DILATION    CYSTOSCOPY  09/13/2014    [facility]    CYSTOSCOPY  09/30/2019    CYSTOSCOPY W BIOPSY OF BLADDER N/A 9/30/2019    CYSTOSCOPY, performed by Sophie Morfin MD at [address] [address]    intractable bleeding    OTHER SURGICAL HISTORY  11/09/2016    cystoscopy bladder biopsy         CURRENT MEDICATIONS

## 2023-06-20 NOTE — DISCHARGE INSTRUCTIONS
Please continue to do evaluations and checks if there is any deterioration please immediately return follow-up with your primary care physician in the next 48-72 hours for reevaluation and recheck

## 2023-07-03 LAB
GLUCOSE BLD-MCNC: 121 MG/DL (ref 70–99)
PERFORMED ON: ABNORMAL

## 2023-10-20 ENCOUNTER — HOSPITAL ENCOUNTER (OUTPATIENT)
Dept: MAMMOGRAPHY | Age: 68
End: 2023-10-20
Payer: MEDICARE

## 2023-10-20 VITALS — WEIGHT: 130 LBS | BODY MASS INDEX: 25.52 KG/M2 | HEIGHT: 60 IN

## 2023-10-20 DIAGNOSIS — Z12.31 VISIT FOR SCREENING MAMMOGRAM: ICD-10-CM

## 2023-10-20 PROCEDURE — 77067 SCR MAMMO BI INCL CAD: CPT

## 2024-03-08 ENCOUNTER — HOSPITAL ENCOUNTER (OUTPATIENT)
Dept: CT IMAGING | Age: 69
Discharge: HOME | End: 2024-03-08
Payer: MEDICARE

## 2024-03-08 DIAGNOSIS — F17.210 CIGARETTE SMOKER: ICD-10-CM

## 2024-03-08 DIAGNOSIS — Z87.891 PERSONAL HISTORY OF TOBACCO USE, PRESENTING HAZARDS TO HEALTH: ICD-10-CM

## 2024-03-08 PROCEDURE — 71271 CT THORAX LUNG CANCER SCR C-: CPT

## 2024-12-06 ENCOUNTER — HOSPITAL ENCOUNTER (OUTPATIENT)
Dept: MAMMOGRAPHY | Age: 69
Discharge: HOME | End: 2024-12-11
Payer: MEDICARE

## 2024-12-06 VITALS — BODY MASS INDEX: 25.52 KG/M2 | WEIGHT: 130 LBS | HEIGHT: 60 IN

## 2024-12-06 DIAGNOSIS — Z12.31 VISIT FOR SCREENING MAMMOGRAM: ICD-10-CM

## 2024-12-06 PROCEDURE — 77063 BREAST TOMOSYNTHESIS BI: CPT

## 2025-02-13 ENCOUNTER — TELEPHONE (OUTPATIENT)
Dept: CASE MANAGEMENT | Age: 70
End: 2025-02-13

## 2025-02-13 NOTE — TELEPHONE ENCOUNTER
Pt due for Annual Lung Screening, reminder letter mailed.    Thank you,  Marianne Mendoza RN  [facility] Navigator  [phone]

## 2025-03-20 ENCOUNTER — TRANSCRIBE ORDERS (OUTPATIENT)
Dept: ADMINISTRATIVE | Age: 70
End: 2025-03-20

## 2025-03-20 DIAGNOSIS — Z87.891 PERSONAL HISTORY OF TOBACCO USE: Primary | ICD-10-CM

## 2025-03-20 DIAGNOSIS — F17.210 CIGARETTE NICOTINE DEPENDENCE WITHOUT COMPLICATION: ICD-10-CM

## (undated) DEVICE — GLOVE ORANGE PI 7 1/2   MSG9075

## (undated) DEVICE — CIRCUIT ANES L72IN 3L BACT AND VIR FLTR EL CONN SGL LIMB

## (undated) DEVICE — GAUZE,SPONGE,4"X4",8PLY,STRL,LF,10/TRAY: Brand: MEDLINE

## (undated) DEVICE — GOWN SIRUS NONREIN LG W/TWL: Brand: MEDLINE INDUSTRIES, INC.

## (undated) DEVICE — BAG URIN STRL FOR URO CTCH SYS

## (undated) DEVICE — DBD-PACK,CYSTOSCOPY,PK VI,AURORA: Brand: MEDLINE

## (undated) DEVICE — MEDI-VAC NON-CONDUCTIVE SUCTION TUBING: Brand: CARDINAL HEALTH

## (undated) DEVICE — PREP SOL PVP IODINE 4%  4 OZ/BTL

## (undated) DEVICE — CYSTO/BLADDER IRRIGATION SET, REGULATING CLAMP

## (undated) DEVICE — BOWL MED L 32OZ PLAS W/ MOLD GRAD EZ OPN PEEL PCH

## (undated) DEVICE — Z DUP USE 2522782 SOLUTION IRRIG 1000ML STRL H2O PLAS CONTAINER UROMATIC